# Patient Record
Sex: FEMALE | Race: WHITE | NOT HISPANIC OR LATINO | Employment: OTHER | URBAN - METROPOLITAN AREA
[De-identification: names, ages, dates, MRNs, and addresses within clinical notes are randomized per-mention and may not be internally consistent; named-entity substitution may affect disease eponyms.]

---

## 2017-12-07 ENCOUNTER — HOSPITAL ENCOUNTER (OUTPATIENT)
Dept: SLEEP MEDICINE | Facility: HOSPITAL | Age: 22
Discharge: HOME | End: 2017-12-07
Attending: INTERNAL MEDICINE
Payer: COMMERCIAL

## 2017-12-07 DIAGNOSIS — G47.10 PERSISTENT DISORDER OF INITIATING OR MAINTAINING WAKEFULNESS: ICD-10-CM

## 2017-12-07 PROCEDURE — 95806 SLEEP STUDY UNATT&RESP EFFT: CPT

## 2018-04-13 ENCOUNTER — HOSPITAL ENCOUNTER (OUTPATIENT)
Dept: SLEEP MEDICINE | Facility: HOSPITAL | Age: 23
Discharge: HOME | End: 2018-04-13
Attending: INTERNAL MEDICINE
Payer: COMMERCIAL

## 2018-04-13 DIAGNOSIS — G47.30 INSOMNIA WITH SLEEP APNEA: ICD-10-CM

## 2018-04-13 DIAGNOSIS — G47.00 INSOMNIA WITH SLEEP APNEA: ICD-10-CM

## 2018-04-13 PROCEDURE — 95810 POLYSOM 6/> YRS 4/> PARAM: CPT | Mod: 26,,, | Performed by: INTERNAL MEDICINE

## 2018-04-13 PROCEDURE — 95810 POLYSOM 6/> YRS 4/> PARAM: CPT

## 2021-06-03 ENCOUNTER — OFFICE VISIT (OUTPATIENT)
Dept: URGENT CARE | Facility: CLINIC | Age: 26
End: 2021-06-03
Payer: COMMERCIAL

## 2021-06-03 VITALS
HEIGHT: 65 IN | OXYGEN SATURATION: 99 % | SYSTOLIC BLOOD PRESSURE: 123 MMHG | WEIGHT: 272 LBS | DIASTOLIC BLOOD PRESSURE: 84 MMHG | HEART RATE: 95 BPM | TEMPERATURE: 98 F | RESPIRATION RATE: 16 BRPM | BODY MASS INDEX: 45.32 KG/M2

## 2021-06-03 DIAGNOSIS — J02.9 SORE THROAT: Primary | ICD-10-CM

## 2021-06-03 LAB
CTP QC/QA: YES
SARS-COV-2 RDRP RESP QL NAA+PROBE: NEGATIVE

## 2021-06-03 PROCEDURE — 3008F PR BODY MASS INDEX (BMI) DOCUMENTED: ICD-10-PCS | Mod: CPTII,S$GLB,, | Performed by: NURSE PRACTITIONER

## 2021-06-03 PROCEDURE — U0002 COVID-19 LAB TEST NON-CDC: HCPCS | Mod: QW,S$GLB,, | Performed by: NURSE PRACTITIONER

## 2021-06-03 PROCEDURE — 99203 PR OFFICE/OUTPT VISIT, NEW, LEVL III, 30-44 MIN: ICD-10-PCS | Mod: S$GLB,,, | Performed by: NURSE PRACTITIONER

## 2021-06-03 PROCEDURE — 3008F BODY MASS INDEX DOCD: CPT | Mod: CPTII,S$GLB,, | Performed by: NURSE PRACTITIONER

## 2021-06-03 PROCEDURE — 99203 OFFICE O/P NEW LOW 30 MIN: CPT | Mod: S$GLB,,, | Performed by: NURSE PRACTITIONER

## 2021-06-03 PROCEDURE — U0002: ICD-10-PCS | Mod: QW,S$GLB,, | Performed by: NURSE PRACTITIONER

## 2021-06-03 RX ORDER — BROMPHENIRAMINE MALEATE, PSEUDOEPHEDRINE HYDROCHLORIDE, AND DEXTROMETHORPHAN HYDROBROMIDE 2; 30; 10 MG/5ML; MG/5ML; MG/5ML
10 SYRUP ORAL EVERY 6 HOURS PRN
Qty: 118 ML | Refills: 0 | Status: SHIPPED | OUTPATIENT
Start: 2021-06-03 | End: 2021-06-13

## 2021-06-03 RX ORDER — PREDNISONE 10 MG/1
10 TABLET ORAL DAILY
Qty: 5 TABLET | Refills: 0 | Status: SHIPPED | OUTPATIENT
Start: 2021-06-03 | End: 2021-06-08

## 2021-06-24 PROBLEM — R15.9 INCONTINENCE OF FECES: Status: ACTIVE | Noted: 2021-06-24

## 2021-09-29 ENCOUNTER — OFFICE VISIT (OUTPATIENT)
Dept: NEUROLOGY | Facility: CLINIC | Age: 26
End: 2021-09-29
Payer: COMMERCIAL

## 2021-09-29 VITALS
HEIGHT: 65 IN | WEIGHT: 272.69 LBS | SYSTOLIC BLOOD PRESSURE: 110 MMHG | HEART RATE: 88 BPM | BODY MASS INDEX: 45.43 KG/M2 | DIASTOLIC BLOOD PRESSURE: 86 MMHG | RESPIRATION RATE: 16 BRPM

## 2021-09-29 DIAGNOSIS — M54.14 RADICULAR PAIN OF THORACIC REGION: Primary | ICD-10-CM

## 2021-09-29 DIAGNOSIS — Z91.419 HISTORY OF ABUSE IN ADULTHOOD: ICD-10-CM

## 2021-09-29 DIAGNOSIS — F44.9 CONVERSION DISORDER: ICD-10-CM

## 2021-09-29 DIAGNOSIS — Z62.819 HISTORY OF ABUSE IN CHILDHOOD: ICD-10-CM

## 2021-09-29 PROCEDURE — 3079F PR MOST RECENT DIASTOLIC BLOOD PRESSURE 80-89 MM HG: ICD-10-PCS | Mod: CPTII,S$GLB,, | Performed by: PSYCHIATRY & NEUROLOGY

## 2021-09-29 PROCEDURE — 1159F MED LIST DOCD IN RCRD: CPT | Mod: CPTII,S$GLB,, | Performed by: PSYCHIATRY & NEUROLOGY

## 2021-09-29 PROCEDURE — 3008F BODY MASS INDEX DOCD: CPT | Mod: CPTII,S$GLB,, | Performed by: PSYCHIATRY & NEUROLOGY

## 2021-09-29 PROCEDURE — 99203 OFFICE O/P NEW LOW 30 MIN: CPT | Mod: S$GLB,,, | Performed by: PSYCHIATRY & NEUROLOGY

## 2021-09-29 PROCEDURE — 1160F PR REVIEW ALL MEDS BY PRESCRIBER/CLIN PHARMACIST DOCUMENTED: ICD-10-PCS | Mod: CPTII,S$GLB,, | Performed by: PSYCHIATRY & NEUROLOGY

## 2021-09-29 PROCEDURE — 4010F ACE/ARB THERAPY RXD/TAKEN: CPT | Mod: CPTII,S$GLB,, | Performed by: PSYCHIATRY & NEUROLOGY

## 2021-09-29 PROCEDURE — 3079F DIAST BP 80-89 MM HG: CPT | Mod: CPTII,S$GLB,, | Performed by: PSYCHIATRY & NEUROLOGY

## 2021-09-29 PROCEDURE — 1160F RVW MEDS BY RX/DR IN RCRD: CPT | Mod: CPTII,S$GLB,, | Performed by: PSYCHIATRY & NEUROLOGY

## 2021-09-29 PROCEDURE — 3074F SYST BP LT 130 MM HG: CPT | Mod: CPTII,S$GLB,, | Performed by: PSYCHIATRY & NEUROLOGY

## 2021-09-29 PROCEDURE — 3074F PR MOST RECENT SYSTOLIC BLOOD PRESSURE < 130 MM HG: ICD-10-PCS | Mod: CPTII,S$GLB,, | Performed by: PSYCHIATRY & NEUROLOGY

## 2021-09-29 PROCEDURE — 99999 PR PBB SHADOW E&M-EST. PATIENT-LVL IV: CPT | Mod: PBBFAC,,, | Performed by: PSYCHIATRY & NEUROLOGY

## 2021-09-29 PROCEDURE — 99203 PR OFFICE/OUTPT VISIT, NEW, LEVL III, 30-44 MIN: ICD-10-PCS | Mod: S$GLB,,, | Performed by: PSYCHIATRY & NEUROLOGY

## 2021-09-29 PROCEDURE — 99999 PR PBB SHADOW E&M-EST. PATIENT-LVL IV: ICD-10-PCS | Mod: PBBFAC,,, | Performed by: PSYCHIATRY & NEUROLOGY

## 2021-09-29 PROCEDURE — 4010F PR ACE/ARB THEARPY RXD/TAKEN: ICD-10-PCS | Mod: CPTII,S$GLB,, | Performed by: PSYCHIATRY & NEUROLOGY

## 2021-09-29 PROCEDURE — 3008F PR BODY MASS INDEX (BMI) DOCUMENTED: ICD-10-PCS | Mod: CPTII,S$GLB,, | Performed by: PSYCHIATRY & NEUROLOGY

## 2021-09-29 PROCEDURE — 1159F PR MEDICATION LIST DOCUMENTED IN MEDICAL RECORD: ICD-10-PCS | Mod: CPTII,S$GLB,, | Performed by: PSYCHIATRY & NEUROLOGY

## 2021-11-07 ENCOUNTER — OFFICE VISIT (OUTPATIENT)
Dept: URGENT CARE | Facility: CLINIC | Age: 26
End: 2021-11-07
Payer: COMMERCIAL

## 2021-11-07 VITALS
RESPIRATION RATE: 19 BRPM | WEIGHT: 273 LBS | HEIGHT: 65 IN | TEMPERATURE: 99 F | SYSTOLIC BLOOD PRESSURE: 93 MMHG | BODY MASS INDEX: 45.48 KG/M2 | HEART RATE: 92 BPM | DIASTOLIC BLOOD PRESSURE: 66 MMHG | OXYGEN SATURATION: 97 %

## 2021-11-07 DIAGNOSIS — B96.89 ACUTE BACTERIAL RHINOSINUSITIS: Primary | ICD-10-CM

## 2021-11-07 DIAGNOSIS — J01.90 ACUTE BACTERIAL RHINOSINUSITIS: Primary | ICD-10-CM

## 2021-11-07 PROCEDURE — 1159F PR MEDICATION LIST DOCUMENTED IN MEDICAL RECORD: ICD-10-PCS | Mod: CPTII,S$GLB,, | Performed by: NURSE PRACTITIONER

## 2021-11-07 PROCEDURE — 3078F DIAST BP <80 MM HG: CPT | Mod: CPTII,S$GLB,, | Performed by: NURSE PRACTITIONER

## 2021-11-07 PROCEDURE — 4010F PR ACE/ARB THEARPY RXD/TAKEN: ICD-10-PCS | Mod: CPTII,S$GLB,, | Performed by: NURSE PRACTITIONER

## 2021-11-07 PROCEDURE — 99213 OFFICE O/P EST LOW 20 MIN: CPT | Mod: S$GLB,,, | Performed by: NURSE PRACTITIONER

## 2021-11-07 PROCEDURE — 3008F BODY MASS INDEX DOCD: CPT | Mod: CPTII,S$GLB,, | Performed by: NURSE PRACTITIONER

## 2021-11-07 PROCEDURE — 1160F PR REVIEW ALL MEDS BY PRESCRIBER/CLIN PHARMACIST DOCUMENTED: ICD-10-PCS | Mod: CPTII,S$GLB,, | Performed by: NURSE PRACTITIONER

## 2021-11-07 PROCEDURE — 3008F PR BODY MASS INDEX (BMI) DOCUMENTED: ICD-10-PCS | Mod: CPTII,S$GLB,, | Performed by: NURSE PRACTITIONER

## 2021-11-07 PROCEDURE — 4010F ACE/ARB THERAPY RXD/TAKEN: CPT | Mod: CPTII,S$GLB,, | Performed by: NURSE PRACTITIONER

## 2021-11-07 PROCEDURE — 1159F MED LIST DOCD IN RCRD: CPT | Mod: CPTII,S$GLB,, | Performed by: NURSE PRACTITIONER

## 2021-11-07 PROCEDURE — 3078F PR MOST RECENT DIASTOLIC BLOOD PRESSURE < 80 MM HG: ICD-10-PCS | Mod: CPTII,S$GLB,, | Performed by: NURSE PRACTITIONER

## 2021-11-07 PROCEDURE — 1160F RVW MEDS BY RX/DR IN RCRD: CPT | Mod: CPTII,S$GLB,, | Performed by: NURSE PRACTITIONER

## 2021-11-07 PROCEDURE — 3074F PR MOST RECENT SYSTOLIC BLOOD PRESSURE < 130 MM HG: ICD-10-PCS | Mod: CPTII,S$GLB,, | Performed by: NURSE PRACTITIONER

## 2021-11-07 PROCEDURE — 99213 PR OFFICE/OUTPT VISIT, EST, LEVL III, 20-29 MIN: ICD-10-PCS | Mod: S$GLB,,, | Performed by: NURSE PRACTITIONER

## 2021-11-07 PROCEDURE — 3074F SYST BP LT 130 MM HG: CPT | Mod: CPTII,S$GLB,, | Performed by: NURSE PRACTITIONER

## 2021-11-07 RX ORDER — AMOXICILLIN 875 MG/1
875 TABLET, FILM COATED ORAL EVERY 12 HOURS
Qty: 20 TABLET | Refills: 0 | Status: SHIPPED | OUTPATIENT
Start: 2021-11-07 | End: 2021-11-17

## 2022-02-14 ENCOUNTER — PATIENT MESSAGE (OUTPATIENT)
Dept: PAIN MEDICINE | Facility: CLINIC | Age: 27
End: 2022-02-14
Payer: COMMERCIAL

## 2022-03-29 ENCOUNTER — HOSPITAL ENCOUNTER (OUTPATIENT)
Dept: RADIOLOGY | Facility: HOSPITAL | Age: 27
Discharge: HOME | End: 2022-03-29
Attending: INTERNAL MEDICINE
Payer: COMMERCIAL

## 2022-03-29 DIAGNOSIS — R91.8 ABNORMAL CHEST X-RAY WITH MULTIPLE NODULES: Primary | ICD-10-CM

## 2022-03-29 DIAGNOSIS — R91.8 ABNORMAL CHEST X-RAY WITH MULTIPLE NODULES: ICD-10-CM

## 2022-03-29 PROCEDURE — 71046 X-RAY EXAM CHEST 2 VIEWS: CPT | Mod: TC

## 2022-07-08 ENCOUNTER — TELEPHONE (OUTPATIENT)
Dept: NEUROLOGY | Facility: CLINIC | Age: 27
End: 2022-07-08
Payer: COMMERCIAL

## 2022-07-08 NOTE — TELEPHONE ENCOUNTER
----- Message from Garrett Mccallum sent at 7/8/2022  3:19 PM CDT -----  Please review the referral/records, scanned into , Clinic  received from Abdulkadir Benitez (you will also find records in the chart, as the provider has access to Epic). The patient has scheduled themselves with Dr. Rogers. I read the most recent clinical notes and MRI but did not see suspicions or finding of lesions/MS.    Please contact the patient if they can see Dr. Stock or Dr. Rogers. If not, please let me know and I will contact the referring office.    Thank you,    Garrett Mccallum  Clinic

## 2022-07-26 ENCOUNTER — OFFICE VISIT (OUTPATIENT)
Dept: PAIN MEDICINE | Facility: CLINIC | Age: 27
End: 2022-07-26
Payer: COMMERCIAL

## 2022-07-26 ENCOUNTER — OFFICE VISIT (OUTPATIENT)
Dept: RHEUMATOLOGY | Facility: CLINIC | Age: 27
End: 2022-07-26
Payer: COMMERCIAL

## 2022-07-26 VITALS
OXYGEN SATURATION: 98 % | BODY MASS INDEX: 45.26 KG/M2 | HEIGHT: 65 IN | RESPIRATION RATE: 20 BRPM | DIASTOLIC BLOOD PRESSURE: 75 MMHG | HEART RATE: 89 BPM | SYSTOLIC BLOOD PRESSURE: 117 MMHG | WEIGHT: 271.63 LBS

## 2022-07-26 VITALS
WEIGHT: 268.94 LBS | BODY MASS INDEX: 44.81 KG/M2 | SYSTOLIC BLOOD PRESSURE: 125 MMHG | HEART RATE: 91 BPM | HEIGHT: 65 IN | DIASTOLIC BLOOD PRESSURE: 84 MMHG

## 2022-07-26 DIAGNOSIS — F45.0 SOMATIZATION DISORDER: ICD-10-CM

## 2022-07-26 DIAGNOSIS — G90.59 COMPLEX REGIONAL PAIN SYNDROME TYPE 1 AFFECTING MULTIPLE SITES: Primary | ICD-10-CM

## 2022-07-26 DIAGNOSIS — M54.14 RADICULAR PAIN OF THORACIC REGION: ICD-10-CM

## 2022-07-26 DIAGNOSIS — Z71.89 COUNSELING AND COORDINATION OF CARE: ICD-10-CM

## 2022-07-26 DIAGNOSIS — G89.4 CHRONIC PAIN SYNDROME: ICD-10-CM

## 2022-07-26 DIAGNOSIS — E66.01 MORBID OBESITY: ICD-10-CM

## 2022-07-26 PROCEDURE — 3078F DIAST BP <80 MM HG: CPT | Mod: CPTII,S$GLB,, | Performed by: INTERNAL MEDICINE

## 2022-07-26 PROCEDURE — 1160F PR REVIEW ALL MEDS BY PRESCRIBER/CLIN PHARMACIST DOCUMENTED: ICD-10-PCS | Mod: CPTII,S$GLB,, | Performed by: PHYSICAL MEDICINE & REHABILITATION

## 2022-07-26 PROCEDURE — 3008F PR BODY MASS INDEX (BMI) DOCUMENTED: ICD-10-PCS | Mod: CPTII,S$GLB,, | Performed by: INTERNAL MEDICINE

## 2022-07-26 PROCEDURE — 99204 PR OFFICE/OUTPT VISIT, NEW, LEVL IV, 45-59 MIN: ICD-10-PCS | Mod: S$GLB,,, | Performed by: INTERNAL MEDICINE

## 2022-07-26 PROCEDURE — 4010F ACE/ARB THERAPY RXD/TAKEN: CPT | Mod: CPTII,S$GLB,, | Performed by: INTERNAL MEDICINE

## 2022-07-26 PROCEDURE — 99204 OFFICE O/P NEW MOD 45 MIN: CPT | Mod: S$GLB,,, | Performed by: PHYSICAL MEDICINE & REHABILITATION

## 2022-07-26 PROCEDURE — 1159F PR MEDICATION LIST DOCUMENTED IN MEDICAL RECORD: ICD-10-PCS | Mod: CPTII,S$GLB,, | Performed by: PHYSICAL MEDICINE & REHABILITATION

## 2022-07-26 PROCEDURE — 3074F SYST BP LT 130 MM HG: CPT | Mod: CPTII,S$GLB,, | Performed by: PHYSICAL MEDICINE & REHABILITATION

## 2022-07-26 PROCEDURE — 3008F BODY MASS INDEX DOCD: CPT | Mod: CPTII,S$GLB,, | Performed by: INTERNAL MEDICINE

## 2022-07-26 PROCEDURE — 3008F PR BODY MASS INDEX (BMI) DOCUMENTED: ICD-10-PCS | Mod: CPTII,S$GLB,, | Performed by: PHYSICAL MEDICINE & REHABILITATION

## 2022-07-26 PROCEDURE — 1159F MED LIST DOCD IN RCRD: CPT | Mod: CPTII,S$GLB,, | Performed by: INTERNAL MEDICINE

## 2022-07-26 PROCEDURE — 3079F PR MOST RECENT DIASTOLIC BLOOD PRESSURE 80-89 MM HG: ICD-10-PCS | Mod: CPTII,S$GLB,, | Performed by: PHYSICAL MEDICINE & REHABILITATION

## 2022-07-26 PROCEDURE — 3074F PR MOST RECENT SYSTOLIC BLOOD PRESSURE < 130 MM HG: ICD-10-PCS | Mod: CPTII,S$GLB,, | Performed by: PHYSICAL MEDICINE & REHABILITATION

## 2022-07-26 PROCEDURE — 99999 PR PBB SHADOW E&M-EST. PATIENT-LVL V: ICD-10-PCS | Mod: PBBFAC,,, | Performed by: INTERNAL MEDICINE

## 2022-07-26 PROCEDURE — 4010F PR ACE/ARB THEARPY RXD/TAKEN: ICD-10-PCS | Mod: CPTII,S$GLB,, | Performed by: PHYSICAL MEDICINE & REHABILITATION

## 2022-07-26 PROCEDURE — 99204 PR OFFICE/OUTPT VISIT, NEW, LEVL IV, 45-59 MIN: ICD-10-PCS | Mod: S$GLB,,, | Performed by: PHYSICAL MEDICINE & REHABILITATION

## 2022-07-26 PROCEDURE — 3008F BODY MASS INDEX DOCD: CPT | Mod: CPTII,S$GLB,, | Performed by: PHYSICAL MEDICINE & REHABILITATION

## 2022-07-26 PROCEDURE — 4010F ACE/ARB THERAPY RXD/TAKEN: CPT | Mod: CPTII,S$GLB,, | Performed by: PHYSICAL MEDICINE & REHABILITATION

## 2022-07-26 PROCEDURE — 3078F PR MOST RECENT DIASTOLIC BLOOD PRESSURE < 80 MM HG: ICD-10-PCS | Mod: CPTII,S$GLB,, | Performed by: INTERNAL MEDICINE

## 2022-07-26 PROCEDURE — 1159F PR MEDICATION LIST DOCUMENTED IN MEDICAL RECORD: ICD-10-PCS | Mod: CPTII,S$GLB,, | Performed by: INTERNAL MEDICINE

## 2022-07-26 PROCEDURE — 99999 PR PBB SHADOW E&M-EST. PATIENT-LVL V: ICD-10-PCS | Mod: PBBFAC,,, | Performed by: PHYSICAL MEDICINE & REHABILITATION

## 2022-07-26 PROCEDURE — 1159F MED LIST DOCD IN RCRD: CPT | Mod: CPTII,S$GLB,, | Performed by: PHYSICAL MEDICINE & REHABILITATION

## 2022-07-26 PROCEDURE — 99999 PR PBB SHADOW E&M-EST. PATIENT-LVL V: CPT | Mod: PBBFAC,,, | Performed by: INTERNAL MEDICINE

## 2022-07-26 PROCEDURE — 3074F PR MOST RECENT SYSTOLIC BLOOD PRESSURE < 130 MM HG: ICD-10-PCS | Mod: CPTII,S$GLB,, | Performed by: INTERNAL MEDICINE

## 2022-07-26 PROCEDURE — 99204 OFFICE O/P NEW MOD 45 MIN: CPT | Mod: S$GLB,,, | Performed by: INTERNAL MEDICINE

## 2022-07-26 PROCEDURE — 1160F RVW MEDS BY RX/DR IN RCRD: CPT | Mod: CPTII,S$GLB,, | Performed by: PHYSICAL MEDICINE & REHABILITATION

## 2022-07-26 PROCEDURE — 99999 PR PBB SHADOW E&M-EST. PATIENT-LVL V: CPT | Mod: PBBFAC,,, | Performed by: PHYSICAL MEDICINE & REHABILITATION

## 2022-07-26 PROCEDURE — 3074F SYST BP LT 130 MM HG: CPT | Mod: CPTII,S$GLB,, | Performed by: INTERNAL MEDICINE

## 2022-07-26 PROCEDURE — 3079F DIAST BP 80-89 MM HG: CPT | Mod: CPTII,S$GLB,, | Performed by: PHYSICAL MEDICINE & REHABILITATION

## 2022-07-26 PROCEDURE — 4010F PR ACE/ARB THEARPY RXD/TAKEN: ICD-10-PCS | Mod: CPTII,S$GLB,, | Performed by: INTERNAL MEDICINE

## 2022-07-26 NOTE — PROGRESS NOTES
RHEUMATOLOGY OUTPATIENT CLINIC NOTE    7/26/2022    Attending Rheumatologist: Vidal Batista  Primary Care Provider: Debi Campos MD   Physician Requesting Consultation: Aaareferral Self  No address on file  Chief Complaint/Reason For Consultation:  No chief complaint on file.      Subjective:       HPI  Charlene Mohan is a 26 y.o. White female with medical history noted below who presents for evaluation of AI disease.     Patient presents for evaluation of autoimmune disease. Patient notes in February of 2021 left flank pain that radiates into her left hip, then June 2021, had epidural for the pain which was complicated by numbness of her left leg and los of bladder and bowel control. She note regaining her bowel and bladder after two days. Then she still notes numbness and weakness on the left side of the body, has followed with Neurology and told possibe Conversion disorder. Also notes nail peeling and falling off. +Alopecia, fatigue, poor sleep, wt loss (20 lbs in 2 years), paraesthesias of the hands, snores, GERD, SOB. No rash, no oral/nasal sores, fever, pleurisy/serositis, miscarriages/blood clots, Raynaud's, chest pain. FHX-Mom Graves,OA; Dad-HLD, PTSD, HTN.      Review of Systems   Constitutional: Positive for fatigue. Negative for chills, fever and unexpected weight change.   HENT: Negative for mouth sores and trouble swallowing.    Eyes: Negative for redness and eye dryness.   Respiratory: Positive for cough and shortness of breath.    Cardiovascular: Negative for chest pain.   Gastrointestinal: Negative for abdominal distention, constipation, diarrhea, nausea and vomiting.   Genitourinary: Negative for dysuria, genital sores and vaginal dryness.   Musculoskeletal: Positive for back pain. Negative for arthralgias, gait problem, joint swelling, leg pain, myalgias, neck pain, neck stiffness and joint deformity.   Integumentary:  Negative for rash.   Neurological: Positive for numbness and  headaches. Negative for weakness.   Hematological: Negative for adenopathy. Bruises/bleeds easily.   Psychiatric/Behavioral: Positive for sleep disturbance. Negative for confusion and decreased concentration. The patient is not nervous/anxious.    All other systems reviewed and are negative.       Chronic comorbid conditions affecting medical decision making today:  Past Medical History:   Diagnosis Date    Dyslipidemia     Dysmetabolic syndrome X     Essential hypertension, benign     History of polycystic ovarian syndrome     Hypothyroidism     Obesity      Past Surgical History:   Procedure Laterality Date    ADENOIDECTOMY      SPINAL CORD STIMULATOR IMPLANT      TONSILLECTOMY      WISDOM TOOTH EXTRACTION       Family History   Problem Relation Age of Onset    Hypertension Father     Hyperlipidemia Father     Thyroid disease Mother     Diabetes Maternal Grandmother     Hypertension Paternal Grandmother     Diabetes Paternal Grandmother     Hypertension Paternal Grandfather     Diabetes Paternal Grandfather      Social History     Substance and Sexual Activity   Alcohol Use No     Social History     Tobacco Use   Smoking Status Never Smoker   Smokeless Tobacco Never Used     Social History     Substance and Sexual Activity   Drug Use No       Current Outpatient Medications:     albuterol (PROVENTIL/VENTOLIN HFA) 90 mcg/actuation inhaler, INHALE 2 PUFFS BY MOUTH EVERY 4 TO 6 HOURS AS NEEDED FOR SHORTNESS OF BREATH OR WHEEZING, Disp: , Rfl:     atorvastatin (LIPITOR) 10 MG tablet, Take 10 mg by mouth once daily., Disp: , Rfl:     benzonatate (TESSALON) 100 MG capsule, Take 100 mg by mouth 3 (three) times daily as needed., Disp: , Rfl:     BREO ELLIPTA 100-25 mcg/dose diskus inhaler, Inhale 1 puff into the lungs once daily., Disp: , Rfl:     buPROPion (WELLBUTRIN XL) 300 MG 24 hr tablet, Take 1 tablet by mouth once daily., Disp: , Rfl:     busPIRone (BUSPAR) 5 MG Tab, Take 7.5 mg by mouth 3  (three) times daily., Disp: , Rfl:     CELEBREX 200 mg capsule, Take 200 mg by mouth 2 (two) times daily., Disp: , Rfl:     clonazePAM (KLONOPIN) 0.5 MG tablet, Take 0.25 mg by mouth 2 (two) times daily as needed. , Disp: , Rfl:     eszopiclone (LUNESTA) 1 MG Tab, Take 1 mg by mouth once daily., Disp: , Rfl:     fremanezumab-vfrm (AJOVY SYRINGE) 225 mg/1.5 mL injection, Inject 1.5 mLs (225 mg total) into the skin every 28 days., Disp: 1 mL, Rfl: 11    gabapentin (NEURONTIN) 300 MG capsule, Take 600 mg by mouth 3 (three) times daily., Disp: , Rfl:     HYDROcodone-acetaminophen (NORCO) 7.5-325 mg per tablet, Take 1 tablet by mouth every 8 (eight) hours as needed for Pain., Disp: 15 tablet, Rfl: 0    lisinopriL 10 MG tablet, Take 10 mg by mouth once daily., Disp: , Rfl:     methocarbamoL (ROBAXIN) 750 MG Tab, Take 1 tablet (750 mg total) by mouth 2 (two) times daily as needed (muscle spasms)., Disp: 60 tablet, Rfl: 5    metoprolol succinate (TOPROL-XL) 50 MG 24 hr tablet, Take 50 mg by mouth once daily., Disp: , Rfl:     montelukast (SINGULAIR) 10 mg tablet, Take 10 mg by mouth once daily., Disp: , Rfl:     omalizumab (XOLAIR) 150 mg/mL injection, , Disp: , Rfl:     ondansetron (ZOFRAN) 8 MG tablet, Take 8 mg by mouth 2 (two) times daily as needed., Disp: , Rfl:     OXcarbazepine (TRILEPTAL) 300 MG Tab, Take 1 tablet (300 mg total) by mouth 2 (two) times daily., Disp: 60 tablet, Rfl: 11    oxyCODONE-acetaminophen (PERCOCET) 5-325 mg per tablet, TAKE 1 TABLET BY MOUTH 1 TIME AS NEEDED FOR PAIN, Disp: , Rfl:     pantoprazole (PROTONIX) 40 MG tablet, Take 1 tablet (40 mg total) by mouth once daily. (Patient taking differently: Take 40 mg by mouth 2 (two) times daily.), Disp: 30 tablet, Rfl: 3    pregabalin (LYRICA) 150 MG capsule, Take 150 mg by mouth 3 (three) times daily., Disp: , Rfl:     rizatriptan (MAXALT) 10 MG tablet, Take 1 tablet by mouth. At onset of migraine, can take another tab 2 hrs later  if no relief, Disp: , Rfl:     spironolactone (ALDACTONE) 100 MG tablet, Take 100 mg by mouth 2 (two) times daily., Disp: , Rfl:     traZODone (DESYREL) 150 MG tablet, Take 1 tablet by mouth once daily., Disp: , Rfl:     venlafaxine (EFFEXOR-XR) 150 MG Cp24, Take 2 capsules by mouth once daily., Disp: , Rfl:      Objective:         Vitals:    07/26/22 1411   BP: 117/75   Pulse: 89   Resp: 20     Physical Exam  Can make fist, no synovitis  Onycholysis   Left flank TTP with prior surgical scars   Negative ankle/MTP     Reviewed old and all outside pertinent medical records available.    All lab results personally reviewed and interpreted by me.  Lab Results   Component Value Date    WBC 16.70 (H) 06/23/2021    HGB 13.2 06/23/2021    HCT 38.6 06/23/2021    MCV 90 06/23/2021    MCH 30.7 06/23/2021    MCHC 34.2 06/23/2021    RDW 12.4 06/23/2021     06/23/2021    MPV 7.1 (L) 06/23/2021    PLTEST Appears normal 06/23/2021       Lab Results   Component Value Date     06/23/2021    K 4.5 06/23/2021     06/23/2021    CO2 24 06/23/2021    GLU 91 06/23/2021    BUN 15 06/23/2021    CALCIUM 8.9 06/23/2021    PROT 7.8 06/23/2021    ALBUMIN 4.7 06/23/2021    BILITOT 0.4 06/23/2021    AST 30 06/23/2021    ALKPHOS 65 06/23/2021    ALT 28 06/23/2021       Lab Results   Component Value Date    COLORU Colorless (A) 06/23/2021    APPEARANCEUA Clear 06/23/2021    SPECGRAV 1.015 06/23/2021    PHUR 6.0 06/23/2021    PROTEINUA Negative 06/23/2021    KETONESU Negative 06/23/2021    LEUKOCYTESUR Negative 06/23/2021    NITRITE Negative 06/23/2021    UROBILINOGEN Negative 06/23/2021       Lab Results   Component Value Date    CRP 5.1 06/23/2021       Lab Results   Component Value Date    SEDRATE 40 (H) 09/17/2011       Lab Results   Component Value Date    SEDRATE 40 (H) 09/17/2011       No components found for: 25OHVITDTOT, 21QPMOUE5, 96QZHSNW5, METHODNOTE    Lab Results   Component Value Date    URICACID 4.4 04/21/2011        No components found for: TSPOTTB        Imaging:  All imaging reviewed and independently interpreted by me.         ASSESSMENT / PLAN:     Charlene Mohan is a 26 y.o. White female with:      1. Complex regional pain syndrome type 1 affecting multiple sites  - patients flank pain with numbness and tingling with spinal stimulator and worsening after epidural may be CRPS, she has Hx of Shingles, Post Herpetic Neuralgia is a possibility, she has autoimmune diathesis and would like to get worked up  - work up as below  - continue meds per Neuro and Pain  - reassurance  - C-Reactive Protein; Future  - SERGIO Screen w/Reflex; Future  - Rheumatoid Factor; Future  - CBC Auto Differential; Future  - Comprehensive Metabolic Panel; Future  - Cyclic Citrullinated Peptide Antibody, IgG; Future  - Sedimentation rate; Future  - Vitamin D; Future  - TSH; Future  - THYROID PEROXIDASE ANTIBODY; Future  - CK; Future  - Vitamin B12; Future  - Hepatitis B Surface Ab, Qualitative; Future  - Hepatitis C Antibody; Future  - Hepatitis B Surface Antigen; Future  - Quantiferon Gold TB; Future  - HIV 1/2 Ag/Ab (4th Gen); Future  - RPR; Future  - Anti-Histone Antibody; Future  - Immunoglobulins (IgG, IgA, IgM) Quantitative; Future    2. Other specified counseling  - over 10 minutes spent regarding below topics:  - Immunization counseling done.  - Weight loss counseling done.  - Nutrition and exercise counseling.  - Limitation of alcohol consumption.  - Regular exercise:  Aerobic and resistance.  - Medication counseling provided.    3. Morbid Obesity  - would benefit from decreasing at least 10% of body weight.  - recommended goal of losing 1 lb per week.  - consider nutritionist evaluation.    No follow-ups on file.    Method of contact with patient concerns: [facility]    Disclaimer:  This note is prepared using voice recognition software and as such is likely to have errors and has not been proof read. Please contact me for  questions.     Time spent: 45 minutes in face to face discussion concerning diagnosis, prognosis, review of lab and test results, benefits of treatment as well as management of disease, counseling of patient and coordination of care between various health care providers.  Greater than half the time spent was used for coordination of care and counseling of patient.    Vidal Batista M.D.  Rheumatology Department   [facility]

## 2022-07-26 NOTE — PROGRESS NOTES
[facility]  New Patient H&P    Referring Provider: Abdulkadir Benitez Md  [address]   [address]  [place] [address]    Chief Complaint:   Chief Complaint   Patient presents with    Back Pain     Left side, started feb 2021       History of Present Illness: Charlene Mohan is a 26 y.o. female referred by Dr. Abdulkadir Benitez for thoracic radiculopathy.     Her pain has been present since 02/2021. She denies traumatic onset, just woke one morning in significant pain. Pain is localized to the left lateral and posterior chest wall around lower ribs with rare radiation to her hip. Pain is described as sharp and shooting. The pain is aggravated by movement, palpation. The pain is alleviated by medications. She endorses weakness and complete numbness throughout her left side. She had sudden onset of weakness and numbness in 06/2021 with bowel and bladder incontinence. Her work-up was largely negative at that time at [facility], and she was diagnosed with conversion disorder. Since then her incontinence has improved. Denies recent fevers or infections. Denies unexplained weight loss.     She has been previously treated by Dr. Kelsey for this issue. She has had multiple epidural injections (no improvement), SCS (70% improvement with trial, worsening pain with placement), and RFA (improvement of lower back pain but not thoracic pain). She has not completed therapy for her back. She is currently in therapy for a Right knee ACL tear. She has also completed therapy when she had the onset of weakness and numbness.      Severity: Currently: 7/10   Typical Range: 5-10/10     Exacerbation: 10/10     Pain Disability Index  Family/Home Responsibilities:: 8  Recreation:: 10  Social Activity:: 6  Occupation:: 9  Sexual Behavior:: 10  Self Care:: 8  Life-Support Activities:: 3  Pain Disability Index (PDI): 54    Previous Interventions:  - multiple epidural injections (no improvement)  - SCS (70% improvement with trial, worse  with placement)  - RFA (improvement of lower back pain but not thoracic pain)    Previous Therapies:  PT/OT: Completed therapy for her knees and limb weakness/numbness  Relevant Surgery: no   Previous Medications:   - NSAIDS:   - Muscle Relaxants: Robaxin   - TCAs:   - SNRIs:   - Topicals: Lidocaine, Capsaicin  - Anticonvulsants: Lyrica, Gabapentin, Trileptal   - Opioids: Percocet    Current Pain Medications:  1. Lyrica 150 mg TID  2. Gabapentin 600 mg TID  3. Trileptal 300 mg BID  4. Robaxin 750 mg QID    Blood Thinners: none    Full Medication List:    Current Outpatient Medications:     albuterol (PROVENTIL/VENTOLIN HFA) 90 mcg/actuation inhaler, INHALE 2 PUFFS BY MOUTH EVERY 4 TO 6 HOURS AS NEEDED FOR SHORTNESS OF BREATH OR WHEEZING, Disp: , Rfl:     atorvastatin (LIPITOR) 10 MG tablet, Take 10 mg by mouth once daily., Disp: , Rfl:     benzonatate (TESSALON) 100 MG capsule, Take 100 mg by mouth 3 (three) times daily as needed., Disp: , Rfl:     BREO ELLIPTA 100-25 mcg/dose diskus inhaler, Inhale 1 puff into the lungs once daily., Disp: , Rfl:     buPROPion (WELLBUTRIN XL) 300 MG 24 hr tablet, Take 1 tablet by mouth once daily., Disp: , Rfl:     busPIRone (BUSPAR) 5 MG Tab, Take 7.5 mg by mouth 3 (three) times daily., Disp: , Rfl:     CELEBREX 200 mg capsule, Take 200 mg by mouth 2 (two) times daily., Disp: , Rfl:     clonazePAM (KLONOPIN) 0.5 MG tablet, Take 0.25 mg by mouth 2 (two) times daily as needed. , Disp: , Rfl:     eszopiclone (LUNESTA) 1 MG Tab, Take 1 mg by mouth once daily., Disp: , Rfl:     fremanezumab-vfrm (AJOVY SYRINGE) 225 mg/1.5 mL injection, Inject 1.5 mLs (225 mg total) into the skin every 28 days., Disp: 1 mL, Rfl: 11    gabapentin (NEURONTIN) 300 MG capsule, Take 600 mg by mouth 3 (three) times daily., Disp: , Rfl:     HYDROcodone-acetaminophen (NORCO) 7.5-325 mg per tablet, Take 1 tablet by mouth every 8 (eight) hours as needed for Pain., Disp: 15 tablet, Rfl: 0    lisinopriL  10 MG tablet, Take 10 mg by mouth once daily., Disp: , Rfl:     methocarbamoL (ROBAXIN) 750 MG Tab, Take 1 tablet (750 mg total) by mouth 2 (two) times daily as needed (muscle spasms)., Disp: 60 tablet, Rfl: 5    metoprolol succinate (TOPROL-XL) 50 MG 24 hr tablet, Take 50 mg by mouth once daily., Disp: , Rfl:     montelukast (SINGULAIR) 10 mg tablet, Take 10 mg by mouth once daily., Disp: , Rfl:     omalizumab (XOLAIR) 150 mg/mL injection, , Disp: , Rfl:     ondansetron (ZOFRAN) 8 MG tablet, Take 8 mg by mouth 2 (two) times daily as needed., Disp: , Rfl:     oxyCODONE-acetaminophen (PERCOCET) 5-325 mg per tablet, TAKE 1 TABLET BY MOUTH 1 TIME AS NEEDED FOR PAIN, Disp: , Rfl:     pantoprazole (PROTONIX) 40 MG tablet, Take 1 tablet (40 mg total) by mouth once daily. (Patient taking differently: Take 40 mg by mouth 2 (two) times daily.), Disp: 30 tablet, Rfl: 3    pregabalin (LYRICA) 150 MG capsule, Take 150 mg by mouth 3 (three) times daily., Disp: , Rfl:     rizatriptan (MAXALT) 10 MG tablet, Take 1 tablet by mouth. At onset of migraine, can take another tab 2 hrs later if no relief, Disp: , Rfl:     spironolactone (ALDACTONE) 100 MG tablet, Take 100 mg by mouth 2 (two) times daily., Disp: , Rfl:     traZODone (DESYREL) 150 MG tablet, Take 1 tablet by mouth once daily., Disp: , Rfl:     venlafaxine (EFFEXOR-XR) 150 MG Cp24, Take 2 capsules by mouth once daily., Disp: , Rfl:     OXcarbazepine (TRILEPTAL) 300 MG Tab, Take 1 tablet (300 mg total) by mouth 2 (two) times daily., Disp: 60 tablet, Rfl: 11     Review of Systems:  Review of Systems   Constitutional: Negative for chills, fever and weight loss.   Respiratory: Negative for cough and shortness of breath.    Cardiovascular: Negative for chest pain, palpitations and leg swelling.   Gastrointestinal: Negative for abdominal pain, nausea and vomiting.   Genitourinary: Negative for dysuria and frequency.   Musculoskeletal: Positive for back pain and joint  "pain. Negative for neck pain.   Neurological: Positive for tingling, sensory change and weakness. Negative for dizziness.   Psychiatric/Behavioral: Negative for depression. The patient is not nervous/anxious.        Allergies:  Diclofenac and Levalbuterol hcl     Medical History:   has a past medical history of Dyslipidemia, Dysmetabolic syndrome X, Essential hypertension, benign, History of polycystic ovarian syndrome, Hypothyroidism, and Obesity.    Surgical History:   has a past surgical history that includes Tonsillectomy; Adenoidectomy; [place] tooth extraction; and Spinal cord stimulator implant.    Family History:  family history includes Diabetes in her maternal grandmother, paternal grandfather, and paternal grandmother; Hyperlipidemia in her father; Hypertension in her father, paternal grandfather, and paternal grandmother; Thyroid disease in her mother.    Social History:   reports that she has never smoked. She has never used smokeless tobacco. She reports that she does not drink alcohol and does not use drugs.    Physical Exam:  /84   Pulse 91   Ht 5' 5" (1.651 m)   Wt 122 kg (268 lb 15.4 oz)   BMI 44.76 kg/m²   GEN: No acute distress. Calm, comfortable  HENT: Normocephalic, atraumatic, moist mucous membranes  EYE: Anicteric sclera, non-injected.   CV: Non-diaphoretic. Regular Rate. Radial Pulses 2+.  RESP: Breathing comfortably. Chest expansion symmetric.  EXT: No clubbing, cyanosis.   SKIN: Warm, & dry to palpation. No visible rashes or lesions of exposed skin.   PSYCH: Pleasant mood and appropriate affect. Recent and remote memory intact.   GAIT: Independent, unsteady ambulation using rollator  Thoracic/Lumbar Spine Exam:       Inspection: No erythema, bruising. No surgical incisions      Palpation: (+) TTP of Thoracic and lumbar paraspinal muscles       ROM: (+) Limited in flexion, extension, lateral bending      (+) Facet loading on left      (-) Straight Leg Raise bilaterally  Hip Exam:   "    Inspection: No gross deformity or apparent leg length discrepancy      Palpation: No TTP      ROM: Not Limited in internal rotation, external rotation  Neurologic Exam:     Alert. Speech is fluent and appropriate.     Strength: Inconsistent MMT in legs, but appears at least 4+/5 throughout bilateral lower extremities on isolated exams, 5/5 in b/l upper limbs     Sensation: Grossly intact to light touch in right lower extremity. Absent sensation to light touch and pin prick in Left upper and lower limbs.     Reflexes: 2+ in right patella, 1+ in right achilles, 2+ in left patella, unable to elicit in left achilles     Tone: No abnormality appreciated in bilateral lower extremities     No Clonus     (-) Perkins bilaterally         Imaging:  - 06/23/2021 Lumbar MRI  Mild disc desiccation at L3-4 and L5-S1.  The cord ends at L1-L2.  Mild irregularity suggesting Schmorl's node or degenerative change along the superior aspect of L4.  There is no acute fracture.  No abnormal signal within the disc.  There is edema in the soft tissues posteriorly consistent recent procedure.  No enhancing lesions 1 mm bulge slightly asymmetric to the left at L3-4.  Incidentally is a 4 mm rounded density hypointense on T1 and T2 weighted images posterior to the thecal sac at the level of the injection felt to probably be a small bubble air.  No mass lesions no significant focal protrusion or spinal stenosis.  No epidural fluid collections to suggest an abscess or hematoma    -06/23/2021 Thoracic MRI  endplate degenerative changes along the superior aspect of T11.  Bone marrow signal is normal.  There is no enhancement of the cord.  Cord signal is normal.  At T7-8 small left paracentral protrusion effacing the cord small central bulge at T8-9    -06/23/2021 Cervical MRI  Cervical vertebral bodies are of normal height alignment.  The cord signal is normal.  There is no enhancement of the cord.  No epidural collections to suggest abscess or  hematoma.  No increased signal at the disc on the T2 images.  No focal central disc protrusions or cord compression or spinal stenosis.  No neural foramina narrowing.    Labs:  BMP  Lab Results   Component Value Date     06/23/2021    K 4.5 06/23/2021     06/23/2021    CO2 24 06/23/2021    BUN 15 06/23/2021    CREATININE 0.75 06/23/2021    CALCIUM 8.9 06/23/2021    ANIONGAP 12.0 02/11/2013    ESTGFRAFRICA >60 06/23/2021    EGFRNONAA >60 06/23/2021     Lab Results   Component Value Date    ALT 28 06/23/2021    AST 30 06/23/2021    ALKPHOS 65 06/23/2021    BILITOT 0.4 06/23/2021     Lab Results   Component Value Date     06/23/2021       Assessment:  Charlene Mohan is a 26 y.o. female with the following diagnoses based on history, exam, and imaging:    Problem List Items Addressed This Visit    None     Visit Diagnoses     BMI 40.0-44.9, adult    -  Primary    Relevant Orders    Ambulatory referral/consult to Bariatric Medicine    Radicular pain of thoracic region        Relevant Orders    Ambulatory referral/consult to [facility]    Somatization disorder        Relevant Orders    Ambulatory referral/consult to [facility]    Chronic pain syndrome        Relevant Orders    Ambulatory referral/consult to [facility]          This is a pleasant 26 y.o. lady presenting with:     - Chronic left thoracic back pain with thoracic radicular pain: MRI with disc bulge at T7-8 and T8-9. However, signs concerning for conversion disorder and potentially somatization disorder either as main reason for her pain, or contributing to the severity of her pain.    - At this point, I feel she has been managed too aggressively with many invasive procedures and feel we should exhaust conservative measures considering the potential somatization disorder. Prioritize: exercise, weight loss and PT.   - Comorbidities: BMI > 40, HTN, HLD, insomnia, PCOS, insulin  resistance, anxiety, depression    Treatment Plan:   - PT/OT/HEP: Referral to functional restoration program.    - Discussed benefits of exercise and weight loss for pain.   - Procedures: No procedures recommended at this time.    - Recommended she f/u with physician who placed her SCS. She is scheduled to follow-up with the provider who placed her SCS for evaluation in 2 weeks.   - Consider left T7 and T8 TF JOSÉ LUIS in future, but only after exhausting conservative measures.   - Medications: We have given her instructions on how to taper gabapentin as it gives minimal relief.  - Imaging: Reviewed.   - Labs: Reviewed.    - Referral to bariatric medicine     Follow Up: RTC as needed    Abdias Zhou MD  [employer] PM&R PGY-4     The patient was seen and examined with the resident. I agree with the above documentation, and have edited as necessary.       Amberly Ward M.D.  Interventional Pain Medicine / Physical Medicine & Rehabilitation    Disclaimer: This note was partly generated using dictation software which may occasionally result in transcription errors.

## 2022-07-27 ENCOUNTER — TELEPHONE (OUTPATIENT)
Dept: ADMINISTRATIVE | Facility: OTHER | Age: 27
End: 2022-07-27
Payer: COMMERCIAL

## 2022-07-27 NOTE — TELEPHONE ENCOUNTER
Left voice message for patient to return call to schedule appointment from referral to Functional Restoration department.  Haylie CONNORS [phone]

## 2022-07-28 ENCOUNTER — TELEPHONE (OUTPATIENT)
Dept: ADMINISTRATIVE | Facility: OTHER | Age: 27
End: 2022-07-28
Payer: COMMERCIAL

## 2022-07-28 NOTE — TELEPHONE ENCOUNTER
Left voice message for patient to return call to schedule appointment from referral to Functional Restoration department..  Haylie CONNORS [phone]

## 2022-08-05 ENCOUNTER — TELEPHONE (OUTPATIENT)
Dept: ADMINISTRATIVE | Facility: OTHER | Age: 27
End: 2022-08-05
Payer: COMMERCIAL

## 2022-08-17 ENCOUNTER — PATIENT MESSAGE (OUTPATIENT)
Dept: RHEUMATOLOGY | Facility: CLINIC | Age: 27
End: 2022-08-17
Payer: COMMERCIAL

## 2022-08-18 ENCOUNTER — TELEPHONE (OUTPATIENT)
Dept: RHEUMATOLOGY | Facility: CLINIC | Age: 27
End: 2022-08-18
Payer: COMMERCIAL

## 2022-08-18 NOTE — TELEPHONE ENCOUNTER
----- Message from Antoinette Hilton sent at 8/18/2022  1:25 PM CDT -----  Regarding: self [phone]  Type:  Patient Returning Call    Who Called:  self    Who Left Message for Patient: Francie     Does the patient know what this is regarding?: results    Would the patient rather a call back or a response via [facility]?  Call back    Best Call Back Number:[phone]

## 2022-08-19 NOTE — TELEPHONE ENCOUNTER
----- Message from Viadl Batista MD sent at 8/10/2022  9:33 AM CDT -----  Anti histone Antibody is weak positive this is seen at times with drug induced lupus, as long as your no longer taking agent this would not affect you. TPO antibody is positive need Endocrine evaluation. Rest of Connective tissue labs negative. Inflammatory markers stable likely due to obesity.   Dr. Batista

## 2022-08-22 ENCOUNTER — TELEPHONE (OUTPATIENT)
Dept: RHEUMATOLOGY | Facility: CLINIC | Age: 27
End: 2022-08-22
Payer: COMMERCIAL

## 2022-08-22 NOTE — TELEPHONE ENCOUNTER
Rt pt call and confirmed full name , d.ob . Reviewed recent labs results and imaging ordered by Dr. Batista .  Pt verbalized understanding .

## 2022-08-22 NOTE — TELEPHONE ENCOUNTER
----- Message from Brandy Venegas sent at 8/22/2022  2:37 PM CDT -----  Regarding: Self/  [phone]  Type: Patient Call Back    Who called:  Patient    What is the request in detail:  Patient returned staffs call and would like a call back please.  Thank you      Would the patient rather a call back or a response via [facility]?   Call back    Best call back number:  [phone] (home)         Thank you

## 2022-08-25 ENCOUNTER — OFFICE VISIT (OUTPATIENT)
Dept: ENDOCRINOLOGY | Facility: CLINIC | Age: 27
End: 2022-08-25
Payer: COMMERCIAL

## 2022-08-25 VITALS
HEIGHT: 65 IN | BODY MASS INDEX: 44.88 KG/M2 | DIASTOLIC BLOOD PRESSURE: 80 MMHG | WEIGHT: 269.38 LBS | SYSTOLIC BLOOD PRESSURE: 130 MMHG

## 2022-08-25 DIAGNOSIS — E06.3 HYPOTHYROIDISM DUE TO HASHIMOTO'S THYROIDITIS: Primary | ICD-10-CM

## 2022-08-25 DIAGNOSIS — E28.2 PCOS (POLYCYSTIC OVARIAN SYNDROME): ICD-10-CM

## 2022-08-25 DIAGNOSIS — E03.8 HYPOTHYROIDISM DUE TO HASHIMOTO'S THYROIDITIS: Primary | ICD-10-CM

## 2022-08-25 PROCEDURE — 4010F PR ACE/ARB THEARPY RXD/TAKEN: ICD-10-PCS | Mod: CPTII,S$GLB,, | Performed by: GENERAL ACUTE CARE HOSPITAL

## 2022-08-25 PROCEDURE — 4010F ACE/ARB THERAPY RXD/TAKEN: CPT | Mod: CPTII,S$GLB,, | Performed by: GENERAL ACUTE CARE HOSPITAL

## 2022-08-25 PROCEDURE — 99999 PR PBB SHADOW E&M-EST. PATIENT-LVL V: ICD-10-PCS | Mod: PBBFAC,,, | Performed by: GENERAL ACUTE CARE HOSPITAL

## 2022-08-25 PROCEDURE — 1160F RVW MEDS BY RX/DR IN RCRD: CPT | Mod: CPTII,S$GLB,, | Performed by: GENERAL ACUTE CARE HOSPITAL

## 2022-08-25 PROCEDURE — 1159F MED LIST DOCD IN RCRD: CPT | Mod: CPTII,S$GLB,, | Performed by: GENERAL ACUTE CARE HOSPITAL

## 2022-08-25 PROCEDURE — 3008F PR BODY MASS INDEX (BMI) DOCUMENTED: ICD-10-PCS | Mod: CPTII,S$GLB,, | Performed by: GENERAL ACUTE CARE HOSPITAL

## 2022-08-25 PROCEDURE — 3079F DIAST BP 80-89 MM HG: CPT | Mod: CPTII,S$GLB,, | Performed by: GENERAL ACUTE CARE HOSPITAL

## 2022-08-25 PROCEDURE — 3075F PR MOST RECENT SYSTOLIC BLOOD PRESS GE 130-139MM HG: ICD-10-PCS | Mod: CPTII,S$GLB,, | Performed by: GENERAL ACUTE CARE HOSPITAL

## 2022-08-25 PROCEDURE — 99204 PR OFFICE/OUTPT VISIT, NEW, LEVL IV, 45-59 MIN: ICD-10-PCS | Mod: S$GLB,,, | Performed by: GENERAL ACUTE CARE HOSPITAL

## 2022-08-25 PROCEDURE — 3075F SYST BP GE 130 - 139MM HG: CPT | Mod: CPTII,S$GLB,, | Performed by: GENERAL ACUTE CARE HOSPITAL

## 2022-08-25 PROCEDURE — 99204 OFFICE O/P NEW MOD 45 MIN: CPT | Mod: S$GLB,,, | Performed by: GENERAL ACUTE CARE HOSPITAL

## 2022-08-25 PROCEDURE — 3008F BODY MASS INDEX DOCD: CPT | Mod: CPTII,S$GLB,, | Performed by: GENERAL ACUTE CARE HOSPITAL

## 2022-08-25 PROCEDURE — 1160F PR REVIEW ALL MEDS BY PRESCRIBER/CLIN PHARMACIST DOCUMENTED: ICD-10-PCS | Mod: CPTII,S$GLB,, | Performed by: GENERAL ACUTE CARE HOSPITAL

## 2022-08-25 PROCEDURE — 99999 PR PBB SHADOW E&M-EST. PATIENT-LVL V: CPT | Mod: PBBFAC,,, | Performed by: GENERAL ACUTE CARE HOSPITAL

## 2022-08-25 PROCEDURE — 1159F PR MEDICATION LIST DOCUMENTED IN MEDICAL RECORD: ICD-10-PCS | Mod: CPTII,S$GLB,, | Performed by: GENERAL ACUTE CARE HOSPITAL

## 2022-08-25 PROCEDURE — 3079F PR MOST RECENT DIASTOLIC BLOOD PRESSURE 80-89 MM HG: ICD-10-PCS | Mod: CPTII,S$GLB,, | Performed by: GENERAL ACUTE CARE HOSPITAL

## 2022-08-25 NOTE — PROGRESS NOTES
Subjective:      Patient ID: Charlene Mohan is a 27 y.o. female.    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     With regards to hypothyroidism:   -Type: (Hashimoto's)       Latest Reference Range & Units 11/22/10 11:15 01/28/12 08:10 02/11/13 08:45 04/09/13 07:44 07/27/22 09:54   TSH 0.400 - 4.000 uIU/mL 2.16 2.163 3.099 1.634 1.658   Free T4 0.71 - 1.51 ng/dl 1.13       Thyroperoxidase Antibodies <6.0 IU/mL  < 6.0   19.7 (H)   PTH 10 - 65 pg/ml  21          Mildly positive TPO Ab      TSH has always been WNL     Not on LT4     Previously on LT4? DENIES     -Hypothyroid symptoms? Having symptoms of fatigue, cold intolerance and constipation         Lab Results   Component Value Date    TSH 1.658 07/27/2022      -  Lab Results   Component Value Date    TSH 1.658 07/27/2022    FREET4 1.13 11/22/2010            -Family history of Thyroid disease:  Mother had Grave's disease   -Thyromegaly?  DENIES         With regards to PCOS:     History of irregular cycles     Pt has and IUD     ON Aldactone 100mg BID for hirsutism    (No active pregnancy plans)     Hirsutism on exam     Not on Metformin but was previously on metformin many years ago      Latest Reference Range & Units 04/21/11 10:49 08/09/11 11:23   Testosterone, Free pg/mL <0.2 <0.2   Testosterone, Total 5 - 73 ng/dl 13 25         A1C is WNL       ROS:   As above    Objective:     There were no vitals taken for this visit.  BP  Readings from Last 3 Encounters:   07/26/22 117/75   07/26/22 125/84   07/14/22 123/77     Wt Readings from Last 1 Encounters:   07/26/22 1411 123.2 kg (271 lb 9.7 oz)     There is no height or weight on file to calculate BMI.      Physical Exam  Vitals reviewed.   Constitutional:       General: She is not in acute distress.     Appearance: Normal appearance. She is well-developed. She is not ill-appearing.   HENT:      Nose: Nose normal. No rhinorrhea.      Mouth/Throat:      Mouth: Mucous membranes are moist.   Eyes:      Extraocular Movements: Extraocular movements intact.      Pupils: Pupils are equal, round, and reactive to light.      Comments: No proptosis, No lid lag, No conjunctival erythema    Neck:      Thyroid: No thyromegaly.      Trachea: No tracheal deviation.      Comments: No thyromegaly or Thyroid nodules palpated on exam   Cardiovascular:      Rate and Rhythm: Normal rate and regular rhythm.      Pulses: Normal pulses.   Pulmonary:      Effort: Pulmonary effort is normal.      Breath sounds: Normal breath sounds.   Abdominal:      Palpations: Abdomen is soft. There is no mass.      Tenderness: There is no abdominal tenderness.      Hernia: No hernia is present.      Comments: No scar tissue, No Violaceous striae    Musculoskeletal:         General: No swelling.      Cervical back: Neck supple. No tenderness.      Right lower leg: No edema.      Left lower leg: No edema.   Skin:     General: Skin is warm.      Findings: No rash.             Comments: Hirsutism, upper lip   Neurological:      General: No focal deficit present.      Mental Status: She is alert and oriented to person, place, and time.   Psychiatric:         Mood and Affect: Mood normal.         Judgment: Judgment normal.                Lab Review:   Lab Results   Component Value Date    HGBA1C 5.2 02/11/2013     Lab Results   Component Value Date    CHOL 206 (H) 02/11/2013    HDL 57 02/11/2013    LDLCALC 94.0 02/11/2013    TRIG 276  (H) 02/11/2013    CHOLHDL 27.7 02/11/2013     Lab Results   Component Value Date     07/27/2022    K 4.5 07/27/2022     07/27/2022    CO2 24 07/27/2022    GLU 87 07/27/2022    BUN 10 07/27/2022    CREATININE 0.7 07/27/2022    CALCIUM 9.3 07/27/2022    PROT 7.1 07/27/2022    ALBUMIN 3.9 07/27/2022    BILITOT 0.2 07/27/2022    ALKPHOS 82 07/27/2022    AST 8 (L) 07/27/2022    ALT 14 07/27/2022    ANIONGAP 10 07/27/2022    ESTGFRAFRICA >60.0 07/27/2022    EGFRNONAA >60.0 07/27/2022    TSH 1.658 07/27/2022     Vit D, 25-Hydroxy   Date Value Ref Range Status   07/27/2022 67 30 - 96 ng/mL Final     Comment:     Vitamin D deficiency.........<10 ng/mL                              Vitamin D insufficiency......10-29 ng/mL       Vitamin D sufficiency........> or equal to 30 ng/mL  Vitamin D toxicity............>100 ng/mL         Assessment and Plan     Hypothyroidism due to Hashimoto's thyroiditis      TSH at goal     No indication for therapy at this time     Send TSH today for monitoring     Will monitor TSH q 6 months as patient is at risk for hypothyroidism in the future       PCOS (polycystic ovarian syndrome)    ON Aldactone 100mg BID  For hirsutism  (Discussed risk of fetal feminization if she becomes pregnant)     On IUD     Following with GYN     Will send Glucose tolerance test to eval for insulin resistance and need for metformin     Will monitor

## 2022-08-25 NOTE — PATIENT INSTRUCTIONS
At this time you do not need medications or thyroid hormone replacement but due to positive thyroid antibodies we need to do further evaluation and at some point you may need thyroid hormone replacement.      We will repeat thyroid levels and sed a complete thyroid antibody panel for further investigation.     Due to your history of PCOS I want to do a 2 hr glucose tolerance test to screen for insulin resistance.     Pending on evaluation I will contact you with results and next steps to follow.     If any questions feel free to contact me.     Have a nice day.     Sincerely,       Deonte Dahl MD   Endocrinology   8/25/2022 3:38 PM

## 2022-08-29 ENCOUNTER — PATIENT MESSAGE (OUTPATIENT)
Dept: ENDOCRINOLOGY | Facility: CLINIC | Age: 27
End: 2022-08-29
Payer: COMMERCIAL

## 2022-08-29 DIAGNOSIS — E28.2 PCOS (POLYCYSTIC OVARIAN SYNDROME): Primary | ICD-10-CM

## 2022-08-29 RX ORDER — METFORMIN HYDROCHLORIDE 500 MG/1
500 TABLET, EXTENDED RELEASE ORAL 2 TIMES DAILY WITH MEALS
Qty: 60 TABLET | Refills: 11 | Status: SHIPPED | OUTPATIENT
Start: 2022-08-29 | End: 2023-05-17

## 2022-08-29 NOTE — TELEPHONE ENCOUNTER
TFTs are WNL.     No indication for Thyroid hormone replacement therapy.        Glucose tolerance with evidence of insulin resistance.     Will offer metformin.

## 2022-09-14 ENCOUNTER — PATIENT MESSAGE (OUTPATIENT)
Dept: ENDOCRINOLOGY | Facility: CLINIC | Age: 27
End: 2022-09-14
Payer: COMMERCIAL

## 2022-09-14 DIAGNOSIS — H53.8 BLURRY VISION: Primary | ICD-10-CM

## 2022-09-19 ENCOUNTER — PATIENT MESSAGE (OUTPATIENT)
Dept: ENDOCRINOLOGY | Facility: CLINIC | Age: 27
End: 2022-09-19
Payer: COMMERCIAL

## 2022-10-04 PROBLEM — Z71.89 ENCOUNTER TO DISCUSS TREATMENT OPTIONS: Status: ACTIVE | Noted: 2022-10-04

## 2022-10-04 PROBLEM — T14.8XXA BRUISING: Status: ACTIVE | Noted: 2022-10-04

## 2022-10-04 PROBLEM — Z71.2 ENCOUNTER TO DISCUSS TEST RESULTS: Status: ACTIVE | Noted: 2022-10-04

## 2022-10-07 ENCOUNTER — PATIENT MESSAGE (OUTPATIENT)
Dept: ENDOCRINOLOGY | Facility: CLINIC | Age: 27
End: 2022-10-07
Payer: COMMERCIAL

## 2022-10-07 DIAGNOSIS — R06.02 SOB (SHORTNESS OF BREATH): Primary | ICD-10-CM

## 2022-10-07 RX ORDER — SPIRONOLACTONE 100 MG/1
100 TABLET, FILM COATED ORAL 2 TIMES DAILY
Qty: 90 TABLET | Refills: 3 | Status: SHIPPED | OUTPATIENT
Start: 2022-10-07 | End: 2023-01-23 | Stop reason: SDUPTHER

## 2022-10-07 RX ORDER — SPIRONOLACTONE 100 MG/1
100 TABLET, FILM COATED ORAL 2 TIMES DAILY
Qty: 90 TABLET | Refills: 3 | Status: CANCELLED | OUTPATIENT
Start: 2022-10-07

## 2022-10-20 ENCOUNTER — OFFICE VISIT (OUTPATIENT)
Dept: OBSTETRICS AND GYNECOLOGY | Facility: CLINIC | Age: 27
End: 2022-10-20
Payer: COMMERCIAL

## 2022-10-20 VITALS
DIASTOLIC BLOOD PRESSURE: 74 MMHG | BODY MASS INDEX: 44.79 KG/M2 | WEIGHT: 269.19 LBS | SYSTOLIC BLOOD PRESSURE: 108 MMHG

## 2022-10-20 DIAGNOSIS — E28.2 POLYCYSTIC OVARIES: ICD-10-CM

## 2022-10-20 DIAGNOSIS — Z11.3 SCREENING EXAMINATION FOR STD (SEXUALLY TRANSMITTED DISEASE): ICD-10-CM

## 2022-10-20 DIAGNOSIS — Z12.4 PAP SMEAR FOR CERVICAL CANCER SCREENING: ICD-10-CM

## 2022-10-20 DIAGNOSIS — Z01.419 ENCOUNTER FOR ANNUAL ROUTINE GYNECOLOGICAL EXAMINATION: Primary | ICD-10-CM

## 2022-10-20 PROCEDURE — 1159F MED LIST DOCD IN RCRD: CPT | Mod: CPTII,S$GLB,, | Performed by: OBSTETRICS & GYNECOLOGY

## 2022-10-20 PROCEDURE — 3074F PR MOST RECENT SYSTOLIC BLOOD PRESSURE < 130 MM HG: ICD-10-PCS | Mod: CPTII,S$GLB,, | Performed by: OBSTETRICS & GYNECOLOGY

## 2022-10-20 PROCEDURE — 99999 PR PBB SHADOW E&M-EST. PATIENT-LVL IV: CPT | Mod: PBBFAC,,, | Performed by: OBSTETRICS & GYNECOLOGY

## 2022-10-20 PROCEDURE — 87591 N.GONORRHOEAE DNA AMP PROB: CPT | Performed by: OBSTETRICS & GYNECOLOGY

## 2022-10-20 PROCEDURE — 4010F ACE/ARB THERAPY RXD/TAKEN: CPT | Mod: CPTII,S$GLB,, | Performed by: OBSTETRICS & GYNECOLOGY

## 2022-10-20 PROCEDURE — 99385 PR PREVENTIVE VISIT,NEW,18-39: ICD-10-PCS | Mod: S$GLB,,, | Performed by: OBSTETRICS & GYNECOLOGY

## 2022-10-20 PROCEDURE — 4010F PR ACE/ARB THEARPY RXD/TAKEN: ICD-10-PCS | Mod: CPTII,S$GLB,, | Performed by: OBSTETRICS & GYNECOLOGY

## 2022-10-20 PROCEDURE — 88175 CYTOPATH C/V AUTO FLUID REDO: CPT | Performed by: OBSTETRICS & GYNECOLOGY

## 2022-10-20 PROCEDURE — 81514 NFCT DS BV&VAGINITIS DNA ALG: CPT | Performed by: OBSTETRICS & GYNECOLOGY

## 2022-10-20 PROCEDURE — 87491 CHLMYD TRACH DNA AMP PROBE: CPT | Performed by: OBSTETRICS & GYNECOLOGY

## 2022-10-20 PROCEDURE — 99385 PREV VISIT NEW AGE 18-39: CPT | Mod: S$GLB,,, | Performed by: OBSTETRICS & GYNECOLOGY

## 2022-10-20 PROCEDURE — 99999 PR PBB SHADOW E&M-EST. PATIENT-LVL IV: ICD-10-PCS | Mod: PBBFAC,,, | Performed by: OBSTETRICS & GYNECOLOGY

## 2022-10-20 PROCEDURE — 3078F PR MOST RECENT DIASTOLIC BLOOD PRESSURE < 80 MM HG: ICD-10-PCS | Mod: CPTII,S$GLB,, | Performed by: OBSTETRICS & GYNECOLOGY

## 2022-10-20 PROCEDURE — 3074F SYST BP LT 130 MM HG: CPT | Mod: CPTII,S$GLB,, | Performed by: OBSTETRICS & GYNECOLOGY

## 2022-10-20 PROCEDURE — 3078F DIAST BP <80 MM HG: CPT | Mod: CPTII,S$GLB,, | Performed by: OBSTETRICS & GYNECOLOGY

## 2022-10-20 PROCEDURE — 1159F PR MEDICATION LIST DOCUMENTED IN MEDICAL RECORD: ICD-10-PCS | Mod: CPTII,S$GLB,, | Performed by: OBSTETRICS & GYNECOLOGY

## 2022-10-20 NOTE — PROGRESS NOTES
Chief Complaint   Patient presents with    Well Woman       HISTORY OF PRESENT ILLNESS:   Charlene Mohan is a 27 y.o. female  with PCOS who presents for well woman exam.  No LMP recorded (lmp unknown). (Menstrual status: Birth Control)..  She has no complaints.  Cycles are  irregular. Had lyletta placed 3 years ago. Desires STD testing.      Past Medical History:   Diagnosis Date    Dyslipidemia     Dysmetabolic syndrome X     Essential hypertension, benign     History of polycystic ovarian syndrome     Hypothyroidism     Obesity        Past Surgical History:   Procedure Laterality Date    ADENOIDECTOMY      SPINAL CORD STIMULATOR IMPLANT      TONSILLECTOMY      WISDOM TOOTH EXTRACTION         Social History     Socioeconomic History    Marital status:    Tobacco Use    Smoking status: Never    Smokeless tobacco: Never   Substance and Sexual Activity    Alcohol use: No    Drug use: No    Sexual activity: Yes     Partners: Male     Birth control/protection: I.U.D.   Social History Narrative    Lives at home with parents, 12 yo brother. No pets. Attending college in [place], studying Mathematics and Computer Science. Denies alcohol, tobacco, drug use. Denies being sexually active.       Family History   Problem Relation Age of Onset    Hypertension Paternal Grandfather     Diabetes Paternal Grandfather     Colon cancer Paternal Grandmother     Hypertension Paternal Grandmother     Diabetes Paternal Grandmother     Diabetes Maternal Grandmother     Hypertension Father     Hyperlipidemia Father     Thyroid disease Mother     Breast cancer Neg Hx     Ovarian cancer Neg Hx        OB History    Para Term  AB Living   0             SAB IAB Ectopic Multiple Live Births                   COMPREHENSIVE GYN HISTORY:  PAP History: Denies abnormal Paps  Infection History: Denies STDs. Denies PID.  Benign History:Denies uterine fibroids. Denies ovarian cysts. Denies endometriosis has PCOS   Cancer History:  Denies cervical cancer. Denies uterine cancer or hyperplasia. Denies ovarian cancer. Denies vulvar cancer or pre-cancer. Denies vaginal cancer or pre-cancer. Denies breast cancer. Denies colon cancer.  Cycle: 12/irregular   Hasn't had HPV vaccine   Dx with PCOS at 11 yo sees endocrine who is managing her pre-dm and monitoring her metabolic issues       ROS:  GENERAL: Denies weight gain or weight loss. Feeling well overall.   SKIN: Denies rash or lesions.   HEAD: Denies headache.   NODES: Denies enlarged lymph nodes.   CHEST: Denies shortness of breath.   ABDOMEN: No abdominal pain, constipation, diarrhea, nausea, vomiting or rectal bleeding.   URINARY: No frequency, dysuria, hematuria, or burning on urination.  REPRODUCTIVE: See HPI.   BREASTS: The patient denies pain, lumps, or nipple discharge.       /74   Wt 122.1 kg (269 lb 2.9 oz)   LMP  (LMP Unknown)   BMI 44.79 kg/m²     APPEARANCE: Well nourished, well developed, in no acute distress.  NECK: Neck symmetric   ABDOMEN: Soft. No tenderness or masses. No hernias. No hepatosplenomegaly.  BREASTS: Symmetrical, no skin changes or visible lesions. No palpable masses, nipple discharge or adenopathy bilaterally.  PELVIC:   VULVA: No lesions. Normal female genitalia.  URETHRAL MEATUS: Normal size and location, no lesions, no prolapse.  URETHRA: No masses, tenderness, prolapse or scarring.  VAGINA: Moist and well rugated, no discharge, no significant cystocele or rectocele.  CERVIX: No lesions and discharge. Iud strings seen   UTERUS: Normal size, regular shape, mobile, non-tender, bladder base nontender.  ADNEXA: No masses or tenderness.      1. Encounter for annual routine gynecological examination    2. Pap smear for cervical cancer screening    3. Screening examination for STD (sexually transmitted disease)    4. Polycystic ovaries        Plan:  Routine gyn s/p normal breast exam. Pap without HPV cotesting ordered . Dsicussed HPV vaccine and she declines  today. STD testing: GC/CT/trich, syphilis, HBV/HCV and HIV ordered. IUD in place, due to replace in 2025      F/u in 1 yr or PRN

## 2022-10-21 LAB
BACTERIAL VAGINOSIS DNA: NEGATIVE
C TRACH DNA SPEC QL NAA+PROBE: NOT DETECTED
CANDIDA GLABRATA DNA: NEGATIVE
CANDIDA KRUSEI DNA: NEGATIVE
CANDIDA RRNA VAG QL PROBE: NEGATIVE
N GONORRHOEA DNA SPEC QL NAA+PROBE: NOT DETECTED
T VAGINALIS RRNA GENITAL QL PROBE: NEGATIVE

## 2022-10-24 ENCOUNTER — PATIENT MESSAGE (OUTPATIENT)
Dept: OBSTETRICS AND GYNECOLOGY | Facility: CLINIC | Age: 27
End: 2022-10-24
Payer: COMMERCIAL

## 2022-10-27 ENCOUNTER — TELEPHONE (OUTPATIENT)
Dept: OPHTHALMOLOGY | Facility: CLINIC | Age: 27
End: 2022-10-27
Payer: COMMERCIAL

## 2022-10-27 LAB
FINAL PATHOLOGIC DIAGNOSIS: NORMAL
Lab: NORMAL

## 2022-10-27 NOTE — TELEPHONE ENCOUNTER
----- Message from Vidal Quintana sent at 10/27/2022 11:13 AM CDT -----  Contact: @Charlene [phone]  Pt calling because a referral was sent in regarding corneal issues and wasn't in the system. Please give her a call back to further assist.

## 2022-11-02 ENCOUNTER — PATIENT MESSAGE (OUTPATIENT)
Dept: OBSTETRICS AND GYNECOLOGY | Facility: CLINIC | Age: 27
End: 2022-11-02
Payer: COMMERCIAL

## 2022-11-10 PROBLEM — K76.0 STEATOSIS OF LIVER: Status: ACTIVE | Noted: 2022-11-10

## 2022-12-14 ENCOUNTER — OFFICE VISIT (OUTPATIENT)
Dept: OPHTHALMOLOGY | Facility: CLINIC | Age: 27
End: 2022-12-14
Payer: COMMERCIAL

## 2022-12-14 DIAGNOSIS — H52.223 REGULAR ASTIGMATISM OF BOTH EYES: Primary | ICD-10-CM

## 2022-12-14 PROCEDURE — 99999 PR PBB SHADOW E&M-EST. PATIENT-LVL II: CPT | Mod: PBBFAC,,, | Performed by: OPHTHALMOLOGY

## 2022-12-14 PROCEDURE — 92004 PR EYE EXAM, NEW PATIENT,COMPREHESV: ICD-10-PCS | Mod: S$GLB,,, | Performed by: OPHTHALMOLOGY

## 2022-12-14 PROCEDURE — 99999 PR PBB SHADOW E&M-EST. PATIENT-LVL II: ICD-10-PCS | Mod: PBBFAC,,, | Performed by: OPHTHALMOLOGY

## 2022-12-14 PROCEDURE — 92004 COMPRE OPH EXAM NEW PT 1/>: CPT | Mod: S$GLB,,, | Performed by: OPHTHALMOLOGY

## 2022-12-14 NOTE — PROGRESS NOTES
HPI     Cornea            Comments: Patient Charlene Mohan is a 27 year old female.          Comments    Pt referred by Dr. Deonte Goodman M.D. for evaluation for corneal   degeneration OU.    LINDSEY: 09/20/2022 with     Meds:    POHx: (+)possible corneal degeneration OU                Last edited by Trinity Brewer on 12/14/2022 10:21 AM.            Assessment /Plan     For exam results, see Encounter Report.    Regular astigmatism of both eyes    High WTR OU, but no signs of KCN on Pentacam at this time  Rec Annual monitoring

## 2023-05-08 ENCOUNTER — TELEPHONE (OUTPATIENT)
Dept: ENDOCRINOLOGY | Facility: CLINIC | Age: 28
End: 2023-05-08
Payer: COMMERCIAL

## 2023-05-08 NOTE — TELEPHONE ENCOUNTER
----- Message from Deon Puckett sent at 5/8/2023  2:27 PM CDT -----  Type:  Sooner Apoointment Request    Caller is requesting a sooner appointment.  Caller declined first available appointment listed below.  Caller will not accept being placed on the waitlist and is requesting a message be sent to doctor.  Name of Caller: Charlene  When is the first available appointment? No appt available between now & 9-  Symptoms: 6 month f/u  Would the patient rather a call back or a response via [facility]? [facility]  Best Call Back Number: [phone]  Additional Information:  no

## 2023-05-17 DIAGNOSIS — E28.2 PCOS (POLYCYSTIC OVARIAN SYNDROME): ICD-10-CM

## 2023-05-17 RX ORDER — METFORMIN HYDROCHLORIDE 500 MG/1
TABLET, EXTENDED RELEASE ORAL
Qty: 60 TABLET | Refills: 11 | Status: SHIPPED | OUTPATIENT
Start: 2023-05-17 | End: 2023-09-14 | Stop reason: SDUPTHER

## 2023-07-27 ENCOUNTER — OFFICE VISIT (OUTPATIENT)
Dept: GASTROENTEROLOGY | Facility: CLINIC | Age: 28
End: 2023-07-27
Payer: COMMERCIAL

## 2023-07-27 VITALS — BODY MASS INDEX: 41.99 KG/M2 | HEIGHT: 65 IN | WEIGHT: 252 LBS

## 2023-07-27 DIAGNOSIS — K21.9 GASTROESOPHAGEAL REFLUX DISEASE, UNSPECIFIED WHETHER ESOPHAGITIS PRESENT: Primary | ICD-10-CM

## 2023-07-27 DIAGNOSIS — K59.04 CHRONIC IDIOPATHIC CONSTIPATION: ICD-10-CM

## 2023-07-27 PROCEDURE — 99204 OFFICE O/P NEW MOD 45 MIN: CPT | Mod: S$GLB,,, | Performed by: NURSE PRACTITIONER

## 2023-07-27 PROCEDURE — 99204 PR OFFICE/OUTPT VISIT, NEW, LEVL IV, 45-59 MIN: ICD-10-PCS | Mod: S$GLB,,, | Performed by: NURSE PRACTITIONER

## 2023-07-27 PROCEDURE — 99999 PR PBB SHADOW E&M-EST. PATIENT-LVL IV: CPT | Mod: PBBFAC,,, | Performed by: NURSE PRACTITIONER

## 2023-07-27 PROCEDURE — 99999 PR PBB SHADOW E&M-EST. PATIENT-LVL IV: ICD-10-PCS | Mod: PBBFAC,,, | Performed by: NURSE PRACTITIONER

## 2023-07-27 PROCEDURE — 3008F BODY MASS INDEX DOCD: CPT | Mod: CPTII,S$GLB,, | Performed by: NURSE PRACTITIONER

## 2023-07-27 PROCEDURE — 4010F ACE/ARB THERAPY RXD/TAKEN: CPT | Mod: CPTII,S$GLB,, | Performed by: NURSE PRACTITIONER

## 2023-07-27 PROCEDURE — 1159F PR MEDICATION LIST DOCUMENTED IN MEDICAL RECORD: ICD-10-PCS | Mod: CPTII,S$GLB,, | Performed by: NURSE PRACTITIONER

## 2023-07-27 PROCEDURE — 4010F PR ACE/ARB THEARPY RXD/TAKEN: ICD-10-PCS | Mod: CPTII,S$GLB,, | Performed by: NURSE PRACTITIONER

## 2023-07-27 PROCEDURE — 1159F MED LIST DOCD IN RCRD: CPT | Mod: CPTII,S$GLB,, | Performed by: NURSE PRACTITIONER

## 2023-07-27 PROCEDURE — 3008F PR BODY MASS INDEX (BMI) DOCUMENTED: ICD-10-PCS | Mod: CPTII,S$GLB,, | Performed by: NURSE PRACTITIONER

## 2023-07-27 RX ORDER — ESOMEPRAZOLE MAGNESIUM 40 MG/1
40 CAPSULE, DELAYED RELEASE ORAL
Qty: 30 CAPSULE | Refills: 11 | Status: SHIPPED | OUTPATIENT
Start: 2023-07-27 | End: 2023-09-20 | Stop reason: ALTCHOICE

## 2023-07-27 NOTE — PROGRESS NOTES
GASTROENTEROLOGY CLINIC NOTE    Chief Complaint: The primary encounter diagnosis was Gastroesophageal reflux disease, unspecified whether esophagitis present. A diagnosis of Chronic idiopathic constipation was also pertinent to this visit.  Referring provider/PCP: Debi Campos MD    Charlene Mohan is a 27 y.o. female who is a new patient to me with a PMH that's significant for PCOS, nausea, and abdominal pain and is accompanied by her .  She is here today to establish care for reflux and constipation. Reflux has been ongoing for about 10 years with dysphagia occurring over the last six months. She has had EGD a few years ago; unsure of findings other than irritation in the stomach.   Currently taking Protonix 40mg BID.     GERD  When do symptoms occur: after eating and randomly throughout the day  Nausea/Vomiting: Nausea occurring daily and is worse in am and after eating.   Abdominal Pain: epigastric  Nocturnal symptoms: yes  Typical Symptoms    Pyrosis Yes   Accompanied by substernal burning and burning in back of throat   Regurgitation No regurgitation but does report nausea   Water Brash yes   Dysphagia/Odynophagia Feels food sticking in mouth and throat  Occurring about every other day  Drinking water helps move down  Occurring with drier foods and liquids  Coughing with eating  No odynophagia     Atypical Symptoms    Hoarseness/Cough no   Throat Clearing no   Chest Pain Substernal    Asthma yes     Treatments: Protonix (currently taking), omeprazole; Promethazine and zofran for nausea  Triggers: no specific triggers identified  Caffeine: 1 cup coffee daily  ETOH: no  NSAIDs: no    Constipation  How Long: About 8 months   Frequency of Bowel Movements: Appx once a week   Straining: Yes  Stool is hard in consistency   Complete Evacuation:    Hematochezia: no   Abdominal Pain: no   Unexplained Weight Loss/Change in Bowel Habits: no   Water Intake: Tries to get in 8 cups   Daily Fiber Supplement: no      Treatments: stool softeners    Anticoagulation or Antiplatelet: No    History of H.pylori: no  H.pylori Treatment:  Prior Upper Endoscopy: several years ago; irritation in stomach  Prior Colonoscopy: no  Family h/o Colon Cancer: paternal gm  Family h/o Crohn's Disease or Ulcerative Colitis: No  Family h/o Celiac Sprue: No  Abdominal Surgeries: no    Review of Systems   Constitutional:  Negative for weight loss.   HENT:  Negative for sore throat.    Eyes:  Negative for blurred vision.   Respiratory:  Negative for cough.    Cardiovascular:  Negative for chest pain.   Gastrointestinal:  Positive for abdominal pain, constipation, heartburn and nausea. Negative for blood in stool, diarrhea, melena and vomiting.   Genitourinary:  Negative for dysuria.   Musculoskeletal:  Negative for myalgias.   Skin:  Negative for rash.   Neurological:  Negative for headaches.   Endo/Heme/Allergies:  Negative for environmental allergies.   Psychiatric/Behavioral:  Negative for suicidal ideas. The patient is not nervous/anxious.        Past Medical History: has a past medical history of Dyslipidemia, Dysmetabolic syndrome X, Essential hypertension, benign, History of polycystic ovarian syndrome, Hypothyroidism, and Obesity.    Past Surgical History: has a past surgical history that includes Tonsillectomy; Adenoidectomy; [place] tooth extraction; and Spinal cord stimulator implant.    Family History:family history includes Colon cancer in her paternal grandmother; Diabetes in her maternal grandmother, paternal grandfather, and paternal grandmother; Hyperlipidemia in her father; Hypertension in her father, paternal grandfather, and paternal grandmother; Thyroid disease in her mother.    Allergies:   Review of patient's allergies indicates:   Allergen Reactions    Diclofenac     Levalbuterol hcl      Other reaction(s): Headache       Social History: reports that she has never smoked. She has never used smokeless tobacco. She reports that  she does not drink alcohol and does not use drugs.    Home medications:   Current Outpatient Medications on File Prior to Visit   Medication Sig Dispense Refill    albuterol (PROVENTIL/VENTOLIN HFA) 90 mcg/actuation inhaler INHALE 2 PUFFS BY MOUTH EVERY 4 TO 6 HOURS AS NEEDED FOR SHORTNESS OF BREATH OR WHEEZING      atorvastatin (LIPITOR) 10 MG tablet Take 10 mg by mouth once daily.      BREO ELLIPTA 100-25 mcg/dose diskus inhaler Inhale 1 puff into the lungs once daily.      buPROPion (WELLBUTRIN XL) 300 MG 24 hr tablet Take 1 tablet by mouth once daily.      busPIRone (BUSPAR) 7.5 MG tablet 15 mg 3 (three) times daily.      gabapentin (NEURONTIN) 300 MG capsule Take 600 mg by mouth 3 (three) times daily.      HYDROcodone-acetaminophen (NORCO) 5-325 mg per tablet Take 1 tablet by mouth every 8 (eight) hours as needed for Pain. (Patient taking differently: Take 7.5-325 tablets by mouth once daily.) 15 tablet 0    lisinopriL 10 MG tablet Take 10 mg by mouth once daily.      metFORMIN (GLUCOPHAGE-XR) 500 MG ER 24hr tablet TAKE 1 TABLET(500 MG) BY MOUTH TWICE DAILY WITH MEALS 60 tablet 11    metoprolol succinate (TOPROL-XL) 50 MG 24 hr tablet Take 50 mg by mouth once daily.      montelukast (SINGULAIR) 10 mg tablet Take 10 mg by mouth once daily.      mupirocin (BACTROBAN) 2 % ointment APPLY TOPICALLY TO THE AFFECTED AREA TWICE DAILY MIX W/ NYSTATIN AND APPLY TO NAIL FOLDS APPLY TO BIOPSY SITE      omalizumab (XOLAIR) 150 mg/mL injection       ondansetron (ZOFRAN) 8 MG tablet Take 8 mg by mouth 2 (two) times daily as needed.      pregabalin (LYRICA) 150 MG capsule Take 150 mg by mouth 3 (three) times daily.      spironolactone (ALDACTONE) 100 MG tablet TAKE 1 TABLET(100 MG) BY MOUTH TWICE DAILY 90 tablet 3    tiZANidine (ZANAFLEX) 4 MG tablet       traZODone (DESYREL) 150 MG tablet Take 300 mg by mouth once daily.      venlafaxine (EFFEXOR-XR) 150 MG Cp24 Take 2 capsules by mouth once daily.      CELEBREX 200 mg  "capsule Take 200 mg by mouth 2 (two) times daily.      hydrocortisone 2.5 % cream APPLY TOPICALLY TO THE AFFECTED AREA EVERY DAY      nystatin (MYCOSTATIN) cream APPLY TOPICALLY TO THE AFFECTED AREA DAILY MIX W/ MUPIROCIN AND APPLY TO NAIL FOLDS      sumatriptan (IMITREX) 100 MG tablet Take 1 tablet (100 mg total) by mouth daily as needed for Migraine. 9 tablet 0     No current facility-administered medications on file prior to visit.       Vital signs:  Ht 5' 5" (1.651 m)   Wt 114.3 kg (251 lb 15.8 oz)   BMI 41.93 kg/m²     Physical Exam  Vitals reviewed.   Constitutional:       Appearance: Normal appearance.   HENT:      Head: Normocephalic.   Pulmonary:      Effort: Pulmonary effort is normal. No respiratory distress.   Abdominal:      General: There is no distension.      Palpations: Abdomen is soft.      Tenderness: There is no abdominal tenderness.   Skin:     General: Skin is warm.   Neurological:      Mental Status: She is alert and oriented to person, place, and time.   Psychiatric:         Behavior: Behavior normal.         Thought Content: Thought content normal.         Routine labs:  Lab Results   Component Value Date    WBC 9.50 11/08/2022    HGB 12.1 11/08/2022    HCT 35.0 (L) 11/08/2022    MCV 87 11/08/2022     11/08/2022     Lab Results   Component Value Date    INR 1.0 10/04/2022     Lab Results   Component Value Date    IRON 62 10/04/2022    FERRITIN 62.7 10/04/2022    TIBC 309 10/04/2022     Lab Results   Component Value Date     11/08/2022    K 4.3 11/08/2022     11/08/2022    CO2 24 11/08/2022    BUN 5 (L) 11/08/2022    CREATININE 0.49 (L) 11/08/2022     Lab Results   Component Value Date    ALBUMIN 4.2 11/08/2022    ALT 24 11/08/2022    AST 31 11/08/2022    ALKPHOS 77 11/08/2022    BILITOT 0.2 11/08/2022     No results found for: "GLUCOSE"  Lab Results   Component Value Date    TSH 2.045 08/26/2022     Lab Results   Component Value Date    CALCIUM 9.0 11/08/2022    PHOS " 2.8 04/21/2011       Imaging:  US Abdomen Complete  Narrative: EXAMINATION:  US ABDOMEN COMPLETE    CLINICAL HISTORY:  Bruising    COMPARISON:  None    FINDINGS:  The liver exhibits a heterogeneous echotexture with increased echogenicity, suggesting fatty change.  Portal vein is patent with hepatopetal flow.  Gallbladder is unremarkable.  CBD measures within normal limits at 3 mm.  Kidneys are normal in size and appearance measuring 12.4 cm on the right and 12.4 cm on the left.  Spleen is at the upper normal limits in size measuring 12.1 cm.  Visualized aspects of the pancreas, aorta, IVC and hepatic veins are unremarkable.  Impression: Evidence of hepatic steatosis.    Electronically signed by: Kirk Zurita MD  Date:    11/08/2022  Time:    12:18      I have reviewed prior labs, imaging, and notes.      Assessment:  1. Gastroesophageal reflux disease, unspecified whether esophagitis present    2. Chronic idiopathic constipation      Reflux accompanied by pyrosis, substernal and epigastric burning. Dysphagia recently began about six months ago.   Feels food sticking in back of throat. Liquid will clear. Currently taking Protonix 40mg BID for reflux. Has tried omeprazole in past as well.   Constipation ongoing for about 8 months. Not responding to stool softeners.     Plan:  Orders Placed This Encounter    esomeprazole (NEXIUM) 40 MG capsule    linaCLOtide (LINZESS) 145 mcg Cap capsule    Case Request Endoscopy: EGD (ESOPHAGOGASTRODUODENOSCOPY)     EGD  Class switch PPI Protonix to Nexium   Linzess 145 mcg daily    Consider MBSS pending EGD results and symptoms    Plan of care discussed with patient who is in agreement and verbalized understanding.     I have explained the planned procedures to the patient.The risks, benefits and alternatives of the procedure were also explained in detail. Patient verbalized understanding, all questions were answered. The patient agrees to proceed as planned    Follow Up:  ANTHONY Cameron, BIBIANA,FNP-BC  [facility]    (Portions of this note were dictated using voice recognition software and may contain dictation related errors in spelling/grammar/syntax not found on text review)

## 2023-07-27 NOTE — PATIENT INSTRUCTIONS
EGD Instructions    [facility] [address]  Clinic Office [phone]  Endoscopy Lab [phone]    You are scheduled for an EGD with Dr. Pederson  on  8/9/23 at [facility] in [place].    Check in at the Hospital -1st floor, Information desk.   Call [phone] to reschedule.    You cannot have anything to eat or drink after Midnight. You can brush your teeth with a sip of water.     An adult friend/family member must come with you to drive you home.  You cannot drive, take a taxi, Uber/Lyft or bus to leave the Endoscopy Center alone.  If you do not have someone to drive you home, your test will be cancelled.     Please follow the directions of your doctor if you take any pills that thin your blood. If you take these meds: Aggrenox, Brilinta, Effient, Eliquis, Lovenox, Plavix, Pletal, Pradaxa, Ticilid, Xarelto or Coumadin, let the doctor's office know.    DON'T: On the morning of the test do not take insulin or pills for diabetes.     DO: On the morning of the test, do take any pills for blood pressure, heart, anti-rejection and or seizures with a small sip of water. Bring any inhalers with you.    Leave all valuables and jewelry at home. You will be at the hospital for 2-4 hours.    Call the Endoscopy department at [phone] with any questions about your procedure.    Thank you for choosing [facility].

## 2023-07-27 NOTE — H&P (VIEW-ONLY)
GASTROENTEROLOGY CLINIC NOTE    Chief Complaint: The primary encounter diagnosis was Gastroesophageal reflux disease, unspecified whether esophagitis present. A diagnosis of Chronic idiopathic constipation was also pertinent to this visit.  Referring provider/PCP: Debi Campos MD    Charlene Mohan is a 27 y.o. female who is a new patient to me with a PMH that's significant for PCOS, nausea, and abdominal pain and is accompanied by her .  She is here today to establish care for reflux and constipation. Reflux has been ongoing for about 10 years with dysphagia occurring over the last six months. She has had EGD a few years ago; unsure of findings other than irritation in the stomach.   Currently taking Protonix 40mg BID.     GERD  When do symptoms occur: after eating and randomly throughout the day  Nausea/Vomiting: Nausea occurring daily and is worse in am and after eating.   Abdominal Pain: epigastric  Nocturnal symptoms: yes  Typical Symptoms    Pyrosis Yes   Accompanied by substernal burning and burning in back of throat   Regurgitation No regurgitation but does report nausea   Water Brash yes   Dysphagia/Odynophagia Feels food sticking in mouth and throat  Occurring about every other day  Drinking water helps move down  Occurring with drier foods and liquids  Coughing with eating  No odynophagia     Atypical Symptoms    Hoarseness/Cough no   Throat Clearing no   Chest Pain Substernal    Asthma yes     Treatments: Protonix (currently taking), omeprazole; Promethazine and zofran for nausea  Triggers: no specific triggers identified  Caffeine: 1 cup coffee daily  ETOH: no  NSAIDs: no    Constipation  How Long: About 8 months   Frequency of Bowel Movements: Appx once a week   Straining: Yes  Stool is hard in consistency   Complete Evacuation:    Hematochezia: no   Abdominal Pain: no   Unexplained Weight Loss/Change in Bowel Habits: no   Water Intake: Tries to get in 8 cups   Daily Fiber Supplement: no      Treatments: stool softeners    Anticoagulation or Antiplatelet: No    History of H.pylori: no  H.pylori Treatment:  Prior Upper Endoscopy: several years ago; irritation in stomach  Prior Colonoscopy: no  Family h/o Colon Cancer: paternal gm  Family h/o Crohn's Disease or Ulcerative Colitis: No  Family h/o Celiac Sprue: No  Abdominal Surgeries: no    Review of Systems   Constitutional:  Negative for weight loss.   HENT:  Negative for sore throat.    Eyes:  Negative for blurred vision.   Respiratory:  Negative for cough.    Cardiovascular:  Negative for chest pain.   Gastrointestinal:  Positive for abdominal pain, constipation, heartburn and nausea. Negative for blood in stool, diarrhea, melena and vomiting.   Genitourinary:  Negative for dysuria.   Musculoskeletal:  Negative for myalgias.   Skin:  Negative for rash.   Neurological:  Negative for headaches.   Endo/Heme/Allergies:  Negative for environmental allergies.   Psychiatric/Behavioral:  Negative for suicidal ideas. The patient is not nervous/anxious.        Past Medical History: has a past medical history of Dyslipidemia, Dysmetabolic syndrome X, Essential hypertension, benign, History of polycystic ovarian syndrome, Hypothyroidism, and Obesity.    Past Surgical History: has a past surgical history that includes Tonsillectomy; Adenoidectomy; [place] tooth extraction; and Spinal cord stimulator implant.    Family History:family history includes Colon cancer in her paternal grandmother; Diabetes in her maternal grandmother, paternal grandfather, and paternal grandmother; Hyperlipidemia in her father; Hypertension in her father, paternal grandfather, and paternal grandmother; Thyroid disease in her mother.    Allergies:   Review of patient's allergies indicates:   Allergen Reactions    Diclofenac     Levalbuterol hcl      Other reaction(s): Headache       Social History: reports that she has never smoked. She has never used smokeless tobacco. She reports that  she does not drink alcohol and does not use drugs.    Home medications:   Current Outpatient Medications on File Prior to Visit   Medication Sig Dispense Refill    albuterol (PROVENTIL/VENTOLIN HFA) 90 mcg/actuation inhaler INHALE 2 PUFFS BY MOUTH EVERY 4 TO 6 HOURS AS NEEDED FOR SHORTNESS OF BREATH OR WHEEZING      atorvastatin (LIPITOR) 10 MG tablet Take 10 mg by mouth once daily.      BREO ELLIPTA 100-25 mcg/dose diskus inhaler Inhale 1 puff into the lungs once daily.      buPROPion (WELLBUTRIN XL) 300 MG 24 hr tablet Take 1 tablet by mouth once daily.      busPIRone (BUSPAR) 7.5 MG tablet 15 mg 3 (three) times daily.      gabapentin (NEURONTIN) 300 MG capsule Take 600 mg by mouth 3 (three) times daily.      HYDROcodone-acetaminophen (NORCO) 5-325 mg per tablet Take 1 tablet by mouth every 8 (eight) hours as needed for Pain. (Patient taking differently: Take 7.5-325 tablets by mouth once daily.) 15 tablet 0    lisinopriL 10 MG tablet Take 10 mg by mouth once daily.      metFORMIN (GLUCOPHAGE-XR) 500 MG ER 24hr tablet TAKE 1 TABLET(500 MG) BY MOUTH TWICE DAILY WITH MEALS 60 tablet 11    metoprolol succinate (TOPROL-XL) 50 MG 24 hr tablet Take 50 mg by mouth once daily.      montelukast (SINGULAIR) 10 mg tablet Take 10 mg by mouth once daily.      mupirocin (BACTROBAN) 2 % ointment APPLY TOPICALLY TO THE AFFECTED AREA TWICE DAILY MIX W/ NYSTATIN AND APPLY TO NAIL FOLDS APPLY TO BIOPSY SITE      omalizumab (XOLAIR) 150 mg/mL injection       ondansetron (ZOFRAN) 8 MG tablet Take 8 mg by mouth 2 (two) times daily as needed.      pregabalin (LYRICA) 150 MG capsule Take 150 mg by mouth 3 (three) times daily.      spironolactone (ALDACTONE) 100 MG tablet TAKE 1 TABLET(100 MG) BY MOUTH TWICE DAILY 90 tablet 3    tiZANidine (ZANAFLEX) 4 MG tablet       traZODone (DESYREL) 150 MG tablet Take 300 mg by mouth once daily.      venlafaxine (EFFEXOR-XR) 150 MG Cp24 Take 2 capsules by mouth once daily.      CELEBREX 200 mg  "capsule Take 200 mg by mouth 2 (two) times daily.      hydrocortisone 2.5 % cream APPLY TOPICALLY TO THE AFFECTED AREA EVERY DAY      nystatin (MYCOSTATIN) cream APPLY TOPICALLY TO THE AFFECTED AREA DAILY MIX W/ MUPIROCIN AND APPLY TO NAIL FOLDS      sumatriptan (IMITREX) 100 MG tablet Take 1 tablet (100 mg total) by mouth daily as needed for Migraine. 9 tablet 0     No current facility-administered medications on file prior to visit.       Vital signs:  Ht 5' 5" (1.651 m)   Wt 114.3 kg (251 lb 15.8 oz)   BMI 41.93 kg/m²     Physical Exam  Vitals reviewed.   Constitutional:       Appearance: Normal appearance.   HENT:      Head: Normocephalic.   Pulmonary:      Effort: Pulmonary effort is normal. No respiratory distress.   Abdominal:      General: There is no distension.      Palpations: Abdomen is soft.      Tenderness: There is no abdominal tenderness.   Skin:     General: Skin is warm.   Neurological:      Mental Status: She is alert and oriented to person, place, and time.   Psychiatric:         Behavior: Behavior normal.         Thought Content: Thought content normal.         Routine labs:  Lab Results   Component Value Date    WBC 9.50 11/08/2022    HGB 12.1 11/08/2022    HCT 35.0 (L) 11/08/2022    MCV 87 11/08/2022     11/08/2022     Lab Results   Component Value Date    INR 1.0 10/04/2022     Lab Results   Component Value Date    IRON 62 10/04/2022    FERRITIN 62.7 10/04/2022    TIBC 309 10/04/2022     Lab Results   Component Value Date     11/08/2022    K 4.3 11/08/2022     11/08/2022    CO2 24 11/08/2022    BUN 5 (L) 11/08/2022    CREATININE 0.49 (L) 11/08/2022     Lab Results   Component Value Date    ALBUMIN 4.2 11/08/2022    ALT 24 11/08/2022    AST 31 11/08/2022    ALKPHOS 77 11/08/2022    BILITOT 0.2 11/08/2022     No results found for: "GLUCOSE"  Lab Results   Component Value Date    TSH 2.045 08/26/2022     Lab Results   Component Value Date    CALCIUM 9.0 11/08/2022    PHOS " 2.8 04/21/2011       Imaging:  US Abdomen Complete  Narrative: EXAMINATION:  US ABDOMEN COMPLETE    CLINICAL HISTORY:  Bruising    COMPARISON:  None    FINDINGS:  The liver exhibits a heterogeneous echotexture with increased echogenicity, suggesting fatty change.  Portal vein is patent with hepatopetal flow.  Gallbladder is unremarkable.  CBD measures within normal limits at 3 mm.  Kidneys are normal in size and appearance measuring 12.4 cm on the right and 12.4 cm on the left.  Spleen is at the upper normal limits in size measuring 12.1 cm.  Visualized aspects of the pancreas, aorta, IVC and hepatic veins are unremarkable.  Impression: Evidence of hepatic steatosis.    Electronically signed by: Kirk Zurita MD  Date:    11/08/2022  Time:    12:18      I have reviewed prior labs, imaging, and notes.      Assessment:  1. Gastroesophageal reflux disease, unspecified whether esophagitis present    2. Chronic idiopathic constipation      Reflux accompanied by pyrosis, substernal and epigastric burning. Dysphagia recently began about six months ago.   Feels food sticking in back of throat. Liquid will clear. Currently taking Protonix 40mg BID for reflux. Has tried omeprazole in past as well.   Constipation ongoing for about 8 months. Not responding to stool softeners.     Plan:  Orders Placed This Encounter    esomeprazole (NEXIUM) 40 MG capsule    linaCLOtide (LINZESS) 145 mcg Cap capsule    Case Request Endoscopy: EGD (ESOPHAGOGASTRODUODENOSCOPY)     EGD  Class switch PPI Protonix to Nexium   Linzess 145 mcg daily    Consider MBSS pending EGD results and symptoms    Plan of care discussed with patient who is in agreement and verbalized understanding.     I have explained the planned procedures to the patient.The risks, benefits and alternatives of the procedure were also explained in detail. Patient verbalized understanding, all questions were answered. The patient agrees to proceed as planned    Follow Up:  ANTHONY Cameron, BIBIANA,FNP-BC  [facility] [place]    (Portions of this note were dictated using voice recognition software and may contain dictation related errors in spelling/grammar/syntax not found on text review)

## 2023-08-07 ENCOUNTER — TELEPHONE (OUTPATIENT)
Dept: ENDOSCOPY | Facility: HOSPITAL | Age: 28
End: 2023-08-07
Payer: COMMERCIAL

## 2023-08-07 NOTE — TELEPHONE ENCOUNTER
Left messages instructing patient to call dept @ [phone] between 8am-3pm.    Arrival time to be given @ 1200  EGD  (Message sent via [facility] portal)

## 2023-08-07 NOTE — TELEPHONE ENCOUNTER
Spoke with patient about arrival time @ *1200   EGD    NPO status reviewed: Patient must have nothing to eat after midnight.  Pt may have CLEAR liquids ONLY until completely NPO 3 hrs @ 0900.    Medications: Do not take Insulin or oral diabetic medications the day of the procedure.  Take as prescribed: heart, seizure and blood pressure medication in the morning with a sip of water (less than an ounce).  Take any breathing medications and bring inhalers to hospital with you Leave all valuables and jewelry at home.     Wear comfortable clothes to procedure to change into hospital gown You cannot drive for 24 hours after your procedure because you will receive sedation for your procedure to make you comfortable.  A ride must be provided at discharge.

## 2023-08-09 ENCOUNTER — ANESTHESIA EVENT (OUTPATIENT)
Dept: ENDOSCOPY | Facility: HOSPITAL | Age: 28
End: 2023-08-09
Payer: COMMERCIAL

## 2023-08-09 ENCOUNTER — HOSPITAL ENCOUNTER (OUTPATIENT)
Facility: HOSPITAL | Age: 28
Discharge: HOME | End: 2023-08-09
Attending: INTERNAL MEDICINE | Admitting: INTERNAL MEDICINE
Payer: COMMERCIAL

## 2023-08-09 ENCOUNTER — ANESTHESIA (OUTPATIENT)
Dept: ENDOSCOPY | Facility: HOSPITAL | Age: 28
End: 2023-08-09
Payer: COMMERCIAL

## 2023-08-09 VITALS
HEART RATE: 83 BPM | OXYGEN SATURATION: 97 % | RESPIRATION RATE: 15 BRPM | SYSTOLIC BLOOD PRESSURE: 101 MMHG | WEIGHT: 246 LBS | HEIGHT: 65 IN | BODY MASS INDEX: 40.98 KG/M2 | DIASTOLIC BLOOD PRESSURE: 69 MMHG | TEMPERATURE: 97 F

## 2023-08-09 DIAGNOSIS — K21.9 ACID REFLUX: ICD-10-CM

## 2023-08-09 LAB
B-HCG UR QL: NEGATIVE
CTP QC/QA: YES
POCT GLUCOSE: 89 MG/DL (ref 70–110)

## 2023-08-09 PROCEDURE — C1769 GUIDE WIRE: HCPCS | Performed by: INTERNAL MEDICINE

## 2023-08-09 PROCEDURE — D9220A PRA ANESTHESIA: ICD-10-PCS | Mod: ANES,,, | Performed by: STUDENT IN AN ORGANIZED HEALTH CARE EDUCATION/TRAINING PROGRAM

## 2023-08-09 PROCEDURE — D9220A PRA ANESTHESIA: Mod: ANES,,, | Performed by: STUDENT IN AN ORGANIZED HEALTH CARE EDUCATION/TRAINING PROGRAM

## 2023-08-09 PROCEDURE — 88305 TISSUE EXAM BY PATHOLOGIST: CPT | Mod: 26,,, | Performed by: PATHOLOGY

## 2023-08-09 PROCEDURE — D9220A PRA ANESTHESIA: Mod: CRNA,,, | Performed by: NURSE ANESTHETIST, CERTIFIED REGISTERED

## 2023-08-09 PROCEDURE — 27201012 HC FORCEPS, HOT/COLD, DISP: Performed by: INTERNAL MEDICINE

## 2023-08-09 PROCEDURE — 25000003 PHARM REV CODE 250: Performed by: NURSE ANESTHETIST, CERTIFIED REGISTERED

## 2023-08-09 PROCEDURE — 88342 CHG IMMUNOCYTOCHEMISTRY: ICD-10-PCS | Mod: 26,,, | Performed by: PATHOLOGY

## 2023-08-09 PROCEDURE — 37000008 HC ANESTHESIA 1ST 15 MINUTES: Performed by: INTERNAL MEDICINE

## 2023-08-09 PROCEDURE — 25000003 PHARM REV CODE 250: Performed by: INTERNAL MEDICINE

## 2023-08-09 PROCEDURE — 43248 EGD GUIDE WIRE INSERTION: CPT | Mod: ,,, | Performed by: INTERNAL MEDICINE

## 2023-08-09 PROCEDURE — 88305 TISSUE EXAM BY PATHOLOGIST: ICD-10-PCS | Mod: 26,,, | Performed by: PATHOLOGY

## 2023-08-09 PROCEDURE — 63600175 PHARM REV CODE 636 W HCPCS: Performed by: NURSE ANESTHETIST, CERTIFIED REGISTERED

## 2023-08-09 PROCEDURE — 88342 IMHCHEM/IMCYTCHM 1ST ANTB: CPT | Mod: 26,,, | Performed by: PATHOLOGY

## 2023-08-09 PROCEDURE — 88305 TISSUE EXAM BY PATHOLOGIST: CPT | Performed by: PATHOLOGY

## 2023-08-09 PROCEDURE — 43248 PR EGD, FLEX, W/DILATION OVER GUIDEWIRE: ICD-10-PCS | Mod: ,,, | Performed by: INTERNAL MEDICINE

## 2023-08-09 PROCEDURE — 88342 IMHCHEM/IMCYTCHM 1ST ANTB: CPT | Performed by: PATHOLOGY

## 2023-08-09 PROCEDURE — D9220A PRA ANESTHESIA: ICD-10-PCS | Mod: CRNA,,, | Performed by: NURSE ANESTHETIST, CERTIFIED REGISTERED

## 2023-08-09 PROCEDURE — 43248 EGD GUIDE WIRE INSERTION: CPT | Performed by: INTERNAL MEDICINE

## 2023-08-09 RX ORDER — PROPOFOL 10 MG/ML
VIAL (ML) INTRAVENOUS
Status: DISCONTINUED | OUTPATIENT
Start: 2023-08-09 | End: 2023-08-09

## 2023-08-09 RX ORDER — SODIUM CHLORIDE 9 MG/ML
INJECTION, SOLUTION INTRAVENOUS CONTINUOUS
Status: DISCONTINUED | OUTPATIENT
Start: 2023-08-09 | End: 2023-08-09 | Stop reason: HOSPADM

## 2023-08-09 RX ORDER — LIDOCAINE HYDROCHLORIDE 20 MG/ML
INJECTION INTRAVENOUS
Status: DISCONTINUED | OUTPATIENT
Start: 2023-08-09 | End: 2023-08-09

## 2023-08-09 RX ORDER — PROPOFOL 10 MG/ML
VIAL (ML) INTRAVENOUS CONTINUOUS PRN
Status: DISCONTINUED | OUTPATIENT
Start: 2023-08-09 | End: 2023-08-09

## 2023-08-09 RX ADMIN — PROPOFOL 150 MCG/KG/MIN: 10 INJECTION, EMULSION INTRAVENOUS at 12:08

## 2023-08-09 RX ADMIN — PROPOFOL 80 MG: 10 INJECTION, EMULSION INTRAVENOUS at 12:08

## 2023-08-09 RX ADMIN — LIDOCAINE HYDROCHLORIDE 75 MG: 20 INJECTION, SOLUTION INTRAVENOUS at 12:08

## 2023-08-09 RX ADMIN — SODIUM CHLORIDE: 9 INJECTION, SOLUTION INTRAVENOUS at 12:08

## 2023-08-09 RX ADMIN — PROPOFOL 20 MG: 10 INJECTION, EMULSION INTRAVENOUS at 12:08

## 2023-08-09 NOTE — [employer]ATION PATIENT INSTRUCTIONS
Discharge Summary/Instructions after an Endoscopic Procedure  Patient Name: Charlene Mohan  Patient MRN: [id number]  Patient YOB: 1995 Wednesday, August 9, 2023  Fadi Pederson MD  Dear patient,  As a result of recent federal legislation (The Federal Cures Act), you may   receive lab or pathology results from your procedure in your [facility]   account before your physician is able to contact you. Your physician or   their representative will relay the results to you with their   recommendations at their soonest availability.  Thank you,  Your health is very important to us during the Covid Crisis. Following your   procedure today, you will receive a daily text for 2 weeks asking about   signs or symptoms of Covid 19.  Please respond to this text when you   receive it so we can follow up and keep you as safe as possible.   RESTRICTIONS:  During your procedure today, you received medications for sedation.  These   medications may affect your judgment, balance and coordination.  Therefore,   for 24 hours, you have the following restrictions:   - DO NOT drive a car, operate machinery, make legal/financial decisions,   sign important papers or drink alcohol.    ACTIVITY:  Today: no heavy lifting, straining or running due to procedural   sedation/anesthesia.  The following day: return to full activity including work.  DIET:  Eat and drink normally unless instructed otherwise.     TREATMENT FOR COMMON SIDE EFFECTS:  - Mild abdominal pain, nausea, belching, bloating or excessive gas:  rest,   eat lightly and use a heating pad.  - Sore Throat: treat with throat lozenges and/or gargle with warm salt   water.  - Because air was used during the procedure, expelling large amounts of air   from your rectum or belching is normal.  - If a bowel prep was taken, you may not have a bowel movement for 1-3 days.    This is normal.  SYMPTOMS TO WATCH FOR AND REPORT TO YOUR PHYSICIAN:  1. Abdominal pain or bloating, other than  gas cramps.  2. Chest pain.  3. Back pain.  4. Signs of infection such as: chills or fever occurring within 24 hours   after the procedure.  5. Rectal bleeding, which would show as bright red, maroon, or black stools.   (A tablespoon of blood from the rectum is not serious, especially if   hemorrhoids are present.)  6. Vomiting.  7. Weakness or dizziness.  GO DIRECTLY TO THE NEAREST EMERGENCY ROOM IF YOU HAVE ANY OF THE FOLLOWING:      Difficulty breathing              Chills and/or fever over 101 F   Persistent vomiting and/or vomiting blood   Severe abdominal pain   Severe chest pain   Black, tarry stools   Bleeding- more than one tablespoon   Any other symptom or condition that you feel may need urgent attention  Your doctor recommends these additional instructions:  If any biopsies were taken, your doctors clinic will contact you in 1 to 2   weeks with any results.  - Discharge patient to home.   - Patient has a contact number available for emergencies.  The signs and   symptoms of potential delayed complications were discussed with the   patient.  Return to normal activities tomorrow.  Written discharge   instructions were provided to the patient.   - Resume previous diet.   - Continue present medications.   - Await pathology results.   - Next step would be MBBS / speech therapy eval for possible oropharyngeal   dysphagia.  For questions, problems or results please call your physician - Fadi Pederson MD.  EMERGENCY PHONE NUMBER: [phone],  LAB RESULTS: [phone]  IF A COMPLICATION OR EMERGENCY SITUATION ARISES AND YOU ARE UNABLE TO REACH   YOUR PHYSICIAN - GO DIRECTLY TO THE EMERGENCY ROOM.  Fadi Pederson MD  8/9/2023 1:06:44 PM  This report has been verified and signed electronically.  Dear patient,  As a result of recent federal legislation (The Federal Cures Act), you may   receive lab or pathology results from your procedure in your [facility]   account before your physician is able to  contact you. Your physician or   their representative will relay the results to you with their   recommendations at their soonest availability.  Thank you,  [employer]ATION

## 2023-08-09 NOTE — TRANSFER OF CARE
"Anesthesia Transfer of Care Note    Patient: Charlene Mohan    Procedure(s) Performed: Procedure(s) (LRB):  EGD (ESOPHAGOGASTRODUODENOSCOPY) (N/A)    Patient location: GI    Anesthesia Type: general    Transport from OR: Transported from OR on room air with adequate spontaneous ventilation    Post pain: adequate analgesia    Post assessment: no apparent anesthetic complications and tolerated procedure well    Post vital signs: stable    Level of consciousness: responds to stimulation and sedated    Nausea/Vomiting: no nausea/vomiting    Complications: none    Transfer of care protocol was followed      Last vitals:   Visit Vitals  /63   Pulse 96   Temp 36.4 °C (97.5 °F)   Resp 18   Ht 5' 5" (1.651 m)   Wt 111.6 kg (246 lb)   SpO2 97%   Breastfeeding No   BMI 40.94 kg/m²     "

## 2023-08-09 NOTE — ANESTHESIA PREPROCEDURE EVALUATION
08/09/2023  [facility]  Anesthesia Pre-Operative Evaluation         Patient Name: Charlene Mohan  YOB: 1995  MRN: [id number]    SUBJECTIVE:     Pre-operative evaluation for Procedure(s) (LRB):  EGD (ESOPHAGOGASTRODUODENOSCOPY) (N/A)     08/09/2023    Charlene Mohan is a 28 y.o. female w/ a significant PMHx of morbid obesity (BMI 42), HTN, HLD, anxiety, DM, and left sided paralysis per patient.  Reflux has been ongoing for about 10 years with dysphagia occurring over the last six months.  Patient now presents for the above procedure(s).    TTE:   1. Global left ventricular systolic function is normal. The left   ventricular ejection fraction is 60%.   2. Left ventricular diastolic function is normal.   3. Trace mitral regurgitation. Trace tricuspid regurgitation.   4. The pulmonary artery appears normal.     Prev airway: none documented.     LDA: none documented.     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     Current Outpatient Medications on File Prior to Encounter   Medication Sig Dispense Refill    albuterol (PROVENTIL/VENTOLIN HFA) 90 mcg/actuation inhaler INHALE 2 PUFFS BY MOUTH  EVERY 4 TO 6 HOURS AS NEEDED FOR SHORTNESS OF BREATH OR WHEEZING      atorvastatin (LIPITOR) 10 MG tablet Take 10 mg by mouth once daily.      BREO ELLIPTA 100-25 mcg/dose diskus inhaler Inhale 1 puff into the lungs once daily.      buPROPion (WELLBUTRIN XL) 300 MG 24 hr tablet Take 1 tablet by mouth once daily.      busPIRone (BUSPAR) 7.5 MG tablet 15 mg 3 (three) times daily.      CELEBREX 200 mg capsule Take 200 mg by mouth 2 (two) times daily.      esomeprazole (NEXIUM) 40 MG capsule Take 1 capsule (40 mg total) by mouth before breakfast. 30 capsule 11    gabapentin (NEURONTIN) 300 MG capsule Take 600 mg by mouth 3 (three) times daily.      HYDROcodone-acetaminophen (NORCO) 5-325 mg per tablet Take 1 tablet by mouth every 8 (eight) hours as needed for Pain. (Patient taking differently: Take 7.5-325 tablets by mouth once daily.) 15 tablet 0    linaCLOtide (LINZESS) 145 mcg Cap capsule Take 1 capsule (145 mcg total) by mouth before breakfast. 90 capsule 3    lisinopriL 10 MG tablet Take 10 mg by mouth once daily.      metFORMIN (GLUCOPHAGE-XR) 500 MG ER 24hr tablet TAKE 1 TABLET(500 MG) BY MOUTH TWICE DAILY WITH MEALS 60 tablet 11    metoprolol succinate (TOPROL-XL) 50 MG 24 hr tablet Take 50 mg by mouth once daily.      montelukast (SINGULAIR) 10 mg tablet Take 10 mg by mouth once daily.      mupirocin (BACTROBAN) 2 % ointment APPLY TOPICALLY TO THE AFFECTED AREA TWICE DAILY MIX W/ NYSTATIN AND APPLY TO NAIL FOLDS APPLY TO BIOPSY SITE      omalizumab (XOLAIR) 150 mg/mL injection       ondansetron (ZOFRAN) 8 MG tablet Take 8 mg by mouth 2 (two) times daily as needed.      pregabalin (LYRICA) 150 MG capsule Take 150 mg by mouth 3 (three) times daily.      spironolactone (ALDACTONE) 100 MG tablet TAKE 1 TABLET(100 MG) BY MOUTH TWICE DAILY 90 tablet 3    tiZANidine (ZANAFLEX) 4 MG tablet       traZODone (DESYREL) 150 MG tablet Take 300 mg by mouth once daily.      venlafaxine (EFFEXOR-XR) 150 MG  Cp24 Take 2 capsules by mouth once daily.         Past Surgical History:   Procedure Laterality Date    ADENOIDECTOMY      SPINAL CORD STIMULATOR IMPLANT      TONSILLECTOMY      WISDOM TOOTH EXTRACTION         OBJECTIVE:     Vital Signs Range (Last 24H):         Significant Labs:  Lab Results   Component Value Date    WBC 9.50 11/08/2022    HGB 12.1 11/08/2022    HCT 35.0 (L) 11/08/2022     11/08/2022    CHOL 206 (H) 02/11/2013    TRIG 276 (H) 02/11/2013    HDL 57 02/11/2013    ALT 24 11/08/2022    AST 31 11/08/2022     11/08/2022    K 4.3 11/08/2022     11/08/2022    CREATININE 0.49 (L) 11/08/2022    BUN 5 (L) 11/08/2022    CO2 24 11/08/2022    TSH 2.045 08/26/2022    INR 1.0 10/04/2022    HGBA1C 5.2 02/11/2013       Diagnostic Studies: No relevant studies.    EKG:   Results for orders placed or performed during the hospital encounter of 08/06/20   EKG 12-lead    Collection Time: 08/06/20 12:45 PM    Narrative    Test Reason : R06.02,    Vent. Rate : 090 BPM     Atrial Rate : 090 BPM     P-R Int : 128 ms          QRS Dur : 078 ms      QT Int : 352 ms       P-R-T Axes : 025 000 012 degrees     QTc Int : 430 ms    Normal sinus rhythm  Minimal voltage criteria for LVH, may be normal variant ( R in aVL )  Borderline Abnormal ECG  No previous ECGs available  Confirmed by Ashok Canas DO ([id number]) on 8/6/2020 8:29:25 PM    Referred By: JAD NAVARRO           Confirmed By:Ashok Canas DO       ASSESSMENT/PLAN:           Pre-op Assessment    I have reviewed the Patient Summary Reports.     I have reviewed the Nursing Notes. I have reviewed the NPO Status.   I have reviewed the Medications.     Review of Systems  Anesthesia Hx:  No problems with previous Anesthesia  History of prior surgery of interest to airway management or planning: Denies Family Hx of Anesthesia complications.   Denies Personal Hx of Anesthesia complications.   Hematology/Oncology:  Hematology Normal   Oncology Normal      EENT/Dental:EENT/Dental Normal   Cardiovascular:   Hypertension    Pulmonary:   Shortness of breath    Renal/:  Renal/ Normal     Hepatic/GI:   Liver Disease,    Musculoskeletal:  Musculoskeletal Normal    Neurological:  Neurology Normal    Endocrine:   Diabetes Hypothyroidism  Morbid Obesity / BMI > 40  Psych:   anxiety          Physical Exam  General: Well nourished, Cooperative, Alert and Oriented    Airway:  Mallampati: III   Mouth Opening: Normal  TM Distance: Normal  Tongue: Normal  Neck ROM: Normal ROM    Dental:  Intact        Anesthesia Plan  Type of Anesthesia, risks & benefits discussed:    Anesthesia Type: Gen Natural Airway  Intra-op Monitoring Plan: Standard ASA Monitors  Post Op Pain Control Plan: multimodal analgesia and IV/PO Opioids PRN  Induction:  IV  Informed Consent: Informed consent signed with the Patient and all parties understand the risks and agree with anesthesia plan.  All questions answered.   ASA Score: 3  Day of Surgery Review of History & Physical: H&P Update referred to the surgeon/provider.    Ready For Surgery From Anesthesia Perspective.     .

## 2023-08-09 NOTE — ANESTHESIA POSTPROCEDURE EVALUATION
Anesthesia Post Evaluation    Patient: Charlene Mohan    Procedure(s) Performed: Procedure(s) (LRB):  EGD (ESOPHAGOGASTRODUODENOSCOPY) (N/A)    Final Anesthesia Type: general      Patient location during evaluation: GI PACU  Patient participation: Yes- Able to Participate  Level of consciousness: awake and alert  Post-procedure vital signs: reviewed and stable  Pain management: adequate  Airway patency: patent    PONV status at discharge: No PONV  Anesthetic complications: no      Cardiovascular status: blood pressure returned to baseline  Respiratory status: unassisted  Hydration status: euvolemic            Vitals Value Taken Time   /69 08/09/23 1325   Temp 36.3 °C (97.3 °F) 08/09/23 1308   Pulse 83 08/09/23 1325   Resp 15 08/09/23 1325   SpO2 97 % 08/09/23 1325         Event Time   Out of Recovery 13:38:00         Pain/Triny Score: Triny Score: 10 (8/9/2023  1:25 PM)

## 2023-08-14 LAB
FINAL PATHOLOGIC DIAGNOSIS: NORMAL
GROSS: NORMAL
Lab: NORMAL
MICROSCOPIC EXAM: NORMAL

## 2023-09-05 NOTE — PROGRESS NOTES
Subjective:      Patient ID: Charlene Mohan is a 28 y.o. female.    Chief Complaint:  No chief complaint on file.    History of Present Illness  Charlene Mohan is here for follow up of Hashimoto's and PCOS.  Previously seen by Dr Elena.  Last seen 8/2022.  This is their first visit with me.      With regards to Hashimoto's:    FH of thyroid disease: mother (hyperthyroid- CHAN)  FH of thyroid cancer: denies  Denies personal history of CHAN, FNA or thyroid surgery. Previously on and off replacement.     Latest Reference Range & Units 07/27/22 09:54 08/26/22 09:15   Thyrotropin Receptor Ab 0.00 - 1.75 IU/L  <1.10   Thyroperoxidase Antibodies <6.0 IU/mL 19.7 (H)    Thyroid-Stim IG Quantitative <0.10 IU/L  <0.10   (H): Data is abnormally high    Lab Results   Component Value Date    TSH 2.045 08/26/2022    FREET4 1.13 11/22/2010 1/25/2023  TSH 2.08    Current Medication:  None     Biotin Use: Yes     Current Symptoms:   Reports unexplained weight loss (40lbs in ~1 year), fatigue (may also be from medication, per patient), hair loss, brittle nails, heat intolerance.  Denies constipation, diarrhea, cold intolerance, palpitations.     Denies plans for fertility at this time.   IUD in place.     With regards to PCOS:     8/3/2023  K 4.3  Ca 9.5  Vit D 40     Latest Reference Range & Units 08/09/11 11:23   Testosterone, Free pg/mL <0.2   Testosterone, Total 5 - 73 ng/dl 25      Latest Reference Range & Units 08/09/11 11:23   Glucose 70 - 110 mg/dl 86   Insulin 0.1 - 14.0 uU/mL 33 (H)   (H): Data is abnormally high     Latest Reference Range & Units 08/26/22 09:15   Gluc Fast 70 - 110 mg/dL 89   Gluc 1 HR mg/dL 144   Gluc 2 HR mg/dL 133     Menstrual History/Obstetric:     Menarche: 11 y.o.   Reports history of irregular menstrual cycles.   IUD in place - no menstrual cycles.   OCP use: in the past  Obstetric history: none.    Hyperandrogenism Symptoms:     Hirsutism: yes - upper lip, lower back   Cosmetic measures: none  "  Acne: denies   Scalp hair loss: denies  Weight: loss - see above     Change in ring or shoe size: Denies  Weight gain or difficulty losing weight:  weight loss   Change in stretch marks: denies   Polyuria, polydipsia, nocturia, unexplained weight loss or blurred vision:  polydipsia, weight loss, vision changes (over 1 year - seeing Ophthalmology)    Current Meds:  Spironolactone 100mg twice daily  - on this since ~ 12y.o.   Metformin 500mg twice daily     Reports SMBG checks 60-80s.    Social Hx:   Smoking: denies  EtOH: denies  Illicit Drug use: denies  Employment: disability     Family Hx:   Hx of PCOS: denies  Hx of DM: father, maternal and paternal grandparents     Review of Systems  As above    Objective:   Physical Exam  Vitals reviewed.   Neck:      Thyroid: No thyromegaly.   Cardiovascular:      Rate and Rhythm: Normal rate.      Comments: No edema present  Pulmonary:      Effort: Pulmonary effort is normal.   Abdominal:      Palpations: Abdomen is soft.         Visit Vitals  BP 98/68   Pulse 72   Ht 5' 5" (1.651 m)   Wt 112.1 kg (247 lb 3.9 oz)   SpO2 98%   BMI 41.14 kg/m²       Body mass index is 41.14 kg/m².    Lab Review:   Lab Results   Component Value Date    HGBA1C 5.2 02/11/2013    HGBA1C 5.2 01/28/2012    HGBA1C 5.1 04/21/2011       Lab Results   Component Value Date    CHOL 206 (H) 02/11/2013    HDL 57 02/11/2013    LDLCALC 94.0 02/11/2013    TRIG 276 (H) 02/11/2013    CHOLHDL 27.7 02/11/2013     Lab Results   Component Value Date     11/08/2022    K 4.3 11/08/2022     11/08/2022    CO2 24 11/08/2022    GLU 92 11/08/2022    BUN 5 (L) 11/08/2022    CREATININE 0.49 (L) 11/08/2022    CALCIUM 9.0 11/08/2022    PROT 7.2 11/08/2022    ALBUMIN 4.2 11/08/2022    BILITOT 0.2 11/08/2022    ALKPHOS 77 11/08/2022    AST 31 11/08/2022    ALT 24 11/08/2022    ANIONGAP 7 (L) 11/08/2022    ESTGFRAFRICA >60.0 07/27/2022    EGFRNONAA >60.0 07/27/2022    TSH 2.045 08/26/2022     Vit D, 25-Hydroxy   Date " Value Ref Range Status   07/27/2022 67 30 - 96 ng/mL Final     Comment:     Vitamin D deficiency.........<10 ng/mL                              Vitamin D insufficiency......10-29 ng/mL       Vitamin D sufficiency........> or equal to 30 ng/mL  Vitamin D toxicity............>100 ng/mL       Assessment and Plan     1. Hashimoto's thyroiditis  TSH    T4, Free    US Soft Tissue Head Neck Thyroid      2. PCOS (polycystic ovarian syndrome)  Hemoglobin A1C          Hashimoto's thyroiditis  -- +TPOab.  -- Last TFTs unremarkable - monitor annually /more often if planning for fertility.     PCOS (polycystic ovarian syndrome)  -- Reviewed that this is a diagnosis of exclusion BUT long duration and lack of serious progression is reassuring.    -- Alerted that PCOS is a risk for T2DM.    -- Lifestyle changes and weight loss strategies emphasized.  - Metformin 500mg twice daily.  -- Discussed treatment options for hirsutism.   - Spironolactone 100mg twice daily. Contraception is advised given risk for fetal malformation.       Follow up in about 6 months (around 3/12/2024).

## 2023-09-12 ENCOUNTER — OFFICE VISIT (OUTPATIENT)
Dept: ENDOCRINOLOGY | Facility: CLINIC | Age: 28
End: 2023-09-12
Payer: COMMERCIAL

## 2023-09-12 ENCOUNTER — TELEPHONE (OUTPATIENT)
Dept: ENDOCRINOLOGY | Facility: CLINIC | Age: 28
End: 2023-09-12

## 2023-09-12 VITALS
WEIGHT: 247.25 LBS | HEIGHT: 65 IN | OXYGEN SATURATION: 98 % | BODY MASS INDEX: 41.19 KG/M2 | DIASTOLIC BLOOD PRESSURE: 68 MMHG | HEART RATE: 72 BPM | SYSTOLIC BLOOD PRESSURE: 98 MMHG

## 2023-09-12 DIAGNOSIS — E06.3 HASHIMOTO'S THYROIDITIS: Primary | ICD-10-CM

## 2023-09-12 DIAGNOSIS — E28.2 PCOS (POLYCYSTIC OVARIAN SYNDROME): ICD-10-CM

## 2023-09-12 PROCEDURE — 1159F PR MEDICATION LIST DOCUMENTED IN MEDICAL RECORD: ICD-10-PCS | Mod: CPTII,S$GLB,, | Performed by: NURSE PRACTITIONER

## 2023-09-12 PROCEDURE — 1160F PR REVIEW ALL MEDS BY PRESCRIBER/CLIN PHARMACIST DOCUMENTED: ICD-10-PCS | Mod: CPTII,S$GLB,, | Performed by: NURSE PRACTITIONER

## 2023-09-12 PROCEDURE — 99999 PR PBB SHADOW E&M-EST. PATIENT-LVL V: CPT | Mod: PBBFAC,,, | Performed by: NURSE PRACTITIONER

## 2023-09-12 PROCEDURE — 99999 PR PBB SHADOW E&M-EST. PATIENT-LVL V: ICD-10-PCS | Mod: PBBFAC,,, | Performed by: NURSE PRACTITIONER

## 2023-09-12 PROCEDURE — 3074F SYST BP LT 130 MM HG: CPT | Mod: CPTII,S$GLB,, | Performed by: NURSE PRACTITIONER

## 2023-09-12 PROCEDURE — 3008F PR BODY MASS INDEX (BMI) DOCUMENTED: ICD-10-PCS | Mod: CPTII,S$GLB,, | Performed by: NURSE PRACTITIONER

## 2023-09-12 PROCEDURE — 4010F ACE/ARB THERAPY RXD/TAKEN: CPT | Mod: CPTII,S$GLB,, | Performed by: NURSE PRACTITIONER

## 2023-09-12 PROCEDURE — 99214 PR OFFICE/OUTPT VISIT, EST, LEVL IV, 30-39 MIN: ICD-10-PCS | Mod: S$GLB,,, | Performed by: NURSE PRACTITIONER

## 2023-09-12 PROCEDURE — 4010F PR ACE/ARB THEARPY RXD/TAKEN: ICD-10-PCS | Mod: CPTII,S$GLB,, | Performed by: NURSE PRACTITIONER

## 2023-09-12 PROCEDURE — 1159F MED LIST DOCD IN RCRD: CPT | Mod: CPTII,S$GLB,, | Performed by: NURSE PRACTITIONER

## 2023-09-12 PROCEDURE — 99214 OFFICE O/P EST MOD 30 MIN: CPT | Mod: S$GLB,,, | Performed by: NURSE PRACTITIONER

## 2023-09-12 PROCEDURE — 3074F PR MOST RECENT SYSTOLIC BLOOD PRESSURE < 130 MM HG: ICD-10-PCS | Mod: CPTII,S$GLB,, | Performed by: NURSE PRACTITIONER

## 2023-09-12 PROCEDURE — 1160F RVW MEDS BY RX/DR IN RCRD: CPT | Mod: CPTII,S$GLB,, | Performed by: NURSE PRACTITIONER

## 2023-09-12 PROCEDURE — 3078F PR MOST RECENT DIASTOLIC BLOOD PRESSURE < 80 MM HG: ICD-10-PCS | Mod: CPTII,S$GLB,, | Performed by: NURSE PRACTITIONER

## 2023-09-12 PROCEDURE — 3008F BODY MASS INDEX DOCD: CPT | Mod: CPTII,S$GLB,, | Performed by: NURSE PRACTITIONER

## 2023-09-12 PROCEDURE — 3078F DIAST BP <80 MM HG: CPT | Mod: CPTII,S$GLB,, | Performed by: NURSE PRACTITIONER

## 2023-09-12 RX ORDER — HYDROXYZINE HYDROCHLORIDE 25 MG/1
25 TABLET, FILM COATED ORAL EVERY 6 HOURS PRN
COMMUNITY
Start: 2023-08-22

## 2023-09-12 RX ORDER — ZINC GLUCONATE 50 MG
50 TABLET ORAL DAILY
COMMUNITY

## 2023-09-12 RX ORDER — ATOGEPANT 60 MG/1
1 TABLET ORAL
COMMUNITY
Start: 2023-08-21

## 2023-09-12 RX ORDER — INCONTINENCE PAD,LINER,DISP
EACH MISCELLANEOUS
COMMUNITY

## 2023-09-12 RX ORDER — LEVOMEFOLATE CALCIUM 15 MG
TABLET ORAL
COMMUNITY

## 2023-09-12 RX ORDER — RIMEGEPANT SULFATE 75 MG/75MG
75 TABLET, ORALLY DISINTEGRATING ORAL EVERY OTHER DAY
COMMUNITY
Start: 2023-08-21

## 2023-09-12 RX ORDER — EPINEPHRINE 0.3 MG/.3ML
INJECTION SUBCUTANEOUS
COMMUNITY
Start: 2023-04-24

## 2023-09-12 RX ORDER — ACETAMINOPHEN AND PHENYLEPHRINE HCL 325; 5 MG/1; MG/1
TABLET ORAL
COMMUNITY

## 2023-09-12 RX ORDER — GLUCOSAM/CHONDRO/HERB 149/HYAL 750-100 MG
1000 TABLET ORAL 2 TIMES DAILY
COMMUNITY

## 2023-09-12 RX ORDER — OXCARBAZEPINE 300 MG/1
300 TABLET, FILM COATED ORAL 2 TIMES DAILY
COMMUNITY
Start: 2023-06-16

## 2023-09-12 RX ORDER — MAGNESIUM 200 MG
TABLET ORAL
COMMUNITY

## 2023-09-12 RX ORDER — VALACYCLOVIR HYDROCHLORIDE 1 G/1
1000 TABLET, FILM COATED ORAL
COMMUNITY
Start: 2023-07-19

## 2023-09-12 RX ORDER — NAPROXEN SODIUM 220 MG/1
81 TABLET, FILM COATED ORAL DAILY
COMMUNITY

## 2023-09-12 RX ORDER — MULTIVITAMIN/IRON/FOLIC ACID 18MG-0.4MG
TABLET ORAL
COMMUNITY

## 2023-09-12 NOTE — ASSESSMENT & PLAN NOTE
-- Reviewed that this is a diagnosis of exclusion BUT long duration and lack of serious progression is reassuring.    -- Alerted that PCOS is a risk for T2DM.    -- Lifestyle changes and weight loss strategies emphasized.  - Metformin 500mg twice daily.  -- Discussed treatment options for hirsutism.   - Spironolactone 100mg twice daily. Contraception is advised given risk for fetal malformation.

## 2023-09-14 ENCOUNTER — PATIENT MESSAGE (OUTPATIENT)
Dept: ENDOCRINOLOGY | Facility: CLINIC | Age: 28
End: 2023-09-14
Payer: COMMERCIAL

## 2023-09-14 DIAGNOSIS — E06.3 HYPOTHYROIDISM DUE TO HASHIMOTO'S THYROIDITIS: ICD-10-CM

## 2023-09-14 DIAGNOSIS — E28.2 PCOS (POLYCYSTIC OVARIAN SYNDROME): ICD-10-CM

## 2023-09-14 DIAGNOSIS — E03.8 HYPOTHYROIDISM DUE TO HASHIMOTO'S THYROIDITIS: ICD-10-CM

## 2023-09-14 RX ORDER — METFORMIN HYDROCHLORIDE 500 MG/1
500 TABLET, EXTENDED RELEASE ORAL 2 TIMES DAILY WITH MEALS
Qty: 180 TABLET | Refills: 3 | Status: SHIPPED | OUTPATIENT
Start: 2023-09-14

## 2023-09-14 RX ORDER — SPIRONOLACTONE 100 MG/1
100 TABLET, FILM COATED ORAL 2 TIMES DAILY
Qty: 90 TABLET | Refills: 3 | Status: SHIPPED | OUTPATIENT
Start: 2023-09-14

## 2023-09-20 ENCOUNTER — OFFICE VISIT (OUTPATIENT)
Dept: GASTROENTEROLOGY | Facility: CLINIC | Age: 28
End: 2023-09-20
Payer: COMMERCIAL

## 2023-09-20 ENCOUNTER — PATIENT MESSAGE (OUTPATIENT)
Dept: ENDOCRINOLOGY | Facility: CLINIC | Age: 28
End: 2023-09-20
Payer: COMMERCIAL

## 2023-09-20 VITALS — BODY MASS INDEX: 41.8 KG/M2 | HEIGHT: 65 IN | WEIGHT: 250.88 LBS

## 2023-09-20 DIAGNOSIS — K21.9 GASTROESOPHAGEAL REFLUX DISEASE, UNSPECIFIED WHETHER ESOPHAGITIS PRESENT: ICD-10-CM

## 2023-09-20 DIAGNOSIS — K59.04 CHRONIC IDIOPATHIC CONSTIPATION: Primary | ICD-10-CM

## 2023-09-20 DIAGNOSIS — R13.10 DYSPHAGIA, UNSPECIFIED TYPE: ICD-10-CM

## 2023-09-20 PROCEDURE — 3044F PR MOST RECENT HEMOGLOBIN A1C LEVEL <7.0%: ICD-10-PCS | Mod: CPTII,S$GLB,, | Performed by: NURSE PRACTITIONER

## 2023-09-20 PROCEDURE — 3008F BODY MASS INDEX DOCD: CPT | Mod: CPTII,S$GLB,, | Performed by: NURSE PRACTITIONER

## 2023-09-20 PROCEDURE — 3008F PR BODY MASS INDEX (BMI) DOCUMENTED: ICD-10-PCS | Mod: CPTII,S$GLB,, | Performed by: NURSE PRACTITIONER

## 2023-09-20 PROCEDURE — 4010F PR ACE/ARB THEARPY RXD/TAKEN: ICD-10-PCS | Mod: CPTII,S$GLB,, | Performed by: NURSE PRACTITIONER

## 2023-09-20 PROCEDURE — 99999 PR PBB SHADOW E&M-EST. PATIENT-LVL V: CPT | Mod: PBBFAC,,, | Performed by: NURSE PRACTITIONER

## 2023-09-20 PROCEDURE — 99214 PR OFFICE/OUTPT VISIT, EST, LEVL IV, 30-39 MIN: ICD-10-PCS | Mod: S$GLB,,, | Performed by: NURSE PRACTITIONER

## 2023-09-20 PROCEDURE — 4010F ACE/ARB THERAPY RXD/TAKEN: CPT | Mod: CPTII,S$GLB,, | Performed by: NURSE PRACTITIONER

## 2023-09-20 PROCEDURE — 3044F HG A1C LEVEL LT 7.0%: CPT | Mod: CPTII,S$GLB,, | Performed by: NURSE PRACTITIONER

## 2023-09-20 PROCEDURE — 99214 OFFICE O/P EST MOD 30 MIN: CPT | Mod: S$GLB,,, | Performed by: NURSE PRACTITIONER

## 2023-09-20 PROCEDURE — 99999 PR PBB SHADOW E&M-EST. PATIENT-LVL V: ICD-10-PCS | Mod: PBBFAC,,, | Performed by: NURSE PRACTITIONER

## 2023-09-20 RX ORDER — LANSOPRAZOLE 30 MG/1
30 CAPSULE, DELAYED RELEASE ORAL DAILY
Qty: 30 CAPSULE | Refills: 11 | Status: SHIPPED | OUTPATIENT
Start: 2023-09-20 | End: 2024-02-02 | Stop reason: SDUPTHER

## 2023-09-20 RX ORDER — LUBIPROSTONE 24 UG/1
24 CAPSULE ORAL 2 TIMES DAILY
Qty: 60 CAPSULE | Refills: 11 | Status: SHIPPED | OUTPATIENT
Start: 2023-09-20 | End: 2024-02-02 | Stop reason: SDUPTHER

## 2023-09-20 NOTE — TELEPHONE ENCOUNTER
Component      Latest Ref Rng 9/19/2023   Hemoglobin A1C External      4.0 - 5.6 % 4.9    Estimated Avg Glucose      68 - 131 mg/dL 94    TSH      0.400 - 4.000 uIU/mL 0.571    Free T4      0.71 - 1.51 ng/dL 1.02

## 2023-09-20 NOTE — PROGRESS NOTES
GASTROENTEROLOGY CLINIC NOTE    Chief Complaint: The primary encounter diagnosis was Chronic idiopathic constipation. Diagnoses of Gastroesophageal reflux disease, unspecified whether esophagitis present and Dysphagia, unspecified type were also pertinent to this visit.  Referring provider/PCP: Debi Campos MD Maryssreese Mohan is a 28 y.o. female who is a new patient to me with a PMH that's significant for PCOS, nausea, and abdominal pain and is accompanied by her .  She is here today to establish care for reflux and constipation. Reflux has been ongoing for about 10 years with dysphagia occurring over the last six months. She has had EGD a few years ago; unsure of findings other than irritation in the stomach.   Currently taking Protonix 40mg BID.     GERD  When do symptoms occur: after eating and randomly throughout the day  Nausea/Vomiting: Nausea occurring daily and is worse in am and after eating.   Abdominal Pain: epigastric  Nocturnal symptoms: yes  Typical Symptoms    Pyrosis Yes   Accompanied by substernal burning and burning in back of throat   Regurgitation No regurgitation but does report nausea   Water Brash yes   Dysphagia/Odynophagia Feels food sticking in mouth and throat  Occurring about every other day  Drinking water helps move down  Occurring with drier foods and liquids  Coughing with eating  No odynophagia     Atypical Symptoms    Hoarseness/Cough no   Throat Clearing no   Chest Pain Substernal    Asthma yes     Treatments: Protonix (currently taking), omeprazole; Promethazine and zofran for nausea  Triggers: no specific triggers identified  Caffeine: 1 cup coffee daily  ETOH: no  NSAIDs: no    Constipation  How Long: About 8 months   Frequency of Bowel Movements: Appx once a week   Straining: Yes  Stool is hard in consistency   Complete Evacuation:    Hematochezia: no   Abdominal Pain: no   Unexplained Weight Loss/Change in Bowel Habits: no   Water Intake: Tries to get in 8  cups   Daily Fiber Supplement: no     Treatments: stool softeners    _____________________________________________________________________________________  Interval Note 9/20/2023  Charlene Mohan who is known to me presents for follow up regarding reflux and constipation. Following last office visit, she underwent EGD with dilation with Dr. Pederson. No obvious etiology for symptoms noted with EGD.   Protonix was also changed to Nexium and Linzess initiated for constipation..     No improvement with nausea and reflux, heartburn continues to occur with nexium. Feels symptoms worse with Nexium than Protonix. Nocturnal symptoms.   Continues to have dysphagia. Water clears food    Taking Linzess 145mcg  Will have days 2-3 days without bowel movement and days with several bowel movements per day between 6-8. Mushy and watery in consistency.     Anticoagulation or Antiplatelet: No  GLP-1: No    History of H.pylori: no  H.pylori Treatment:  Prior Upper Endoscopy: 8/2023 with Dr. Pederson  Impression:            - No endoscopic esophageal abnormality to explain                          patient's dysphagia. Esophagus dilated. Dilated.                          - Normal stomach. Biopsied.                          - Normal examined duodenum.     Recommendation:        - Discharge patient to home.                          - Patient has a contact number available for                          emergencies. The signs and symptoms of potential                          delayed complications were discussed with the                          patient. Return to normal activities tomorrow.                          Written discharge instructions were provided to                          the patient.                          - Resume previous diet.                          - Continue present medications.                          - Await pathology results.                          - Next step would be MBBS / speech therapy lanny                           for possible oropharyngeal dysphagia.     Pathology:  Stomach, random (biopsy):   Antral mucosa with marked reactive/regenerative changes and active inflammation   Considerations include erosive/chemical type gastropathy   Oxyntic mucosa with minimal chronic inflammation, non specific   No Helicobacter organisms (routine and immunostain)     Prior Colonoscopy: no  Family h/o Colon Cancer: paternal gm  Family h/o Crohn's Disease or Ulcerative Colitis: No  Family h/o Celiac Sprue: No  Abdominal Surgeries: no    Review of Systems   Constitutional:  Negative for weight loss.   HENT:  Negative for sore throat.    Eyes:  Negative for blurred vision.   Respiratory:  Negative for cough.    Cardiovascular:  Negative for chest pain.   Gastrointestinal:  Positive for abdominal pain, constipation, heartburn and nausea. Negative for blood in stool, diarrhea, melena and vomiting.   Genitourinary:  Negative for dysuria.   Musculoskeletal:  Negative for myalgias.   Skin:  Negative for rash.   Neurological:  Negative for headaches.   Endo/Heme/Allergies:  Negative for environmental allergies.   Psychiatric/Behavioral:  Negative for suicidal ideas. The patient is not nervous/anxious.        Past Medical History: has a past medical history of Dyslipidemia, Dysmetabolic syndrome X, Essential hypertension, benign, History of polycystic ovarian syndrome, Hypothyroidism, and Obesity.    Past Surgical History: has a past surgical history that includes Tonsillectomy; Adenoidectomy; Winn tooth extraction; Spinal cord stimulator implant; and Esophagogastroduodenoscopy (N/A, 8/9/2023).    Family History:family history includes Colon cancer in her paternal grandmother; Diabetes in her maternal grandmother, paternal grandfather, and paternal grandmother; Hyperlipidemia in her father; Hypertension in her father, paternal grandfather, and paternal grandmother; Thyroid disease in her mother.    Allergies:   Review of patient's allergies  indicates:   Allergen Reactions    Diclofenac     Levalbuterol hcl      Other reaction(s): Headache       Social History: reports that she has never smoked. She has never used smokeless tobacco. She reports that she does not drink alcohol and does not use drugs.    Home medications:   Current Outpatient Medications on File Prior to Visit   Medication Sig Dispense Refill    albuterol (PROVENTIL/VENTOLIN HFA) 90 mcg/actuation inhaler INHALE 2 PUFFS BY MOUTH EVERY 4 TO 6 HOURS AS NEEDED FOR SHORTNESS OF BREATH OR WHEEZING      aspirin 81 MG Chew Take 81 mg by mouth once daily.      atorvastatin (LIPITOR) 10 MG tablet Take 10 mg by mouth once daily.      biotin 10,000 mcg Cap Biotin      biotin-keratin (BIOTIN PLUS KERATIN) 10,000-100 mcg-mg Tab Keratin      BREO ELLIPTA 100-25 mcg/dose diskus inhaler Inhale 1 puff into the lungs once daily.      brivaracetam (BRIVIACT) 50 mg Tab Briviact      buPROPion (WELLBUTRIN XL) 300 MG 24 hr tablet Take 1 tablet by mouth once daily.      busPIRone (BUSPAR) 7.5 MG tablet 15 mg 3 (three) times daily.      CELEBREX 200 mg capsule Take 200 mg by mouth 2 (two) times daily.      EPINEPHrine (EPIPEN) 0.3 mg/0.3 mL AtIn as directed Injection prn anaphylaxis      gabapentin (NEURONTIN) 300 MG capsule Take 600 mg by mouth 3 (three) times daily.      HYDROcodone-acetaminophen (NORCO) 5-325 mg per tablet Take 1 tablet by mouth every 8 (eight) hours as needed for Pain. (Patient taking differently: Take 7.5-325 tablets by mouth once daily.) 15 tablet 0    hydrOXYzine HCL (ATARAX) 25 MG tablet Take 25 mg by mouth every 6 (six) hours as needed.      levomefolate calcium (L-METHYLFOLATE) 15 mg Tab L-Methylfolate      lisinopriL 10 MG tablet Take 10 mg by mouth once daily.      magnesium 200 mg Tab 400 mg daily      metFORMIN (GLUCOPHAGE-XR) 500 MG ER 24hr tablet Take 1 tablet (500 mg total) by mouth 2 (two) times daily with meals. 180 tablet 3    metoprolol succinate (TOPROL-XL) 50 MG 24 hr  "tablet Take 50 mg by mouth once daily.      montelukast (SINGULAIR) 10 mg tablet Take 10 mg by mouth once daily.      multivitamin-iron-folic acid (CENTRUM WOMEN) Tab Centrum Women      mupirocin (BACTROBAN) 2 % ointment APPLY TOPICALLY TO THE AFFECTED AREA TWICE DAILY MIX W/ NYSTATIN AND APPLY TO NAIL FOLDS APPLY TO BIOPSY SITE      NURTEC 75 mg odt Take 75 mg by mouth every other day.      omalizumab (XOLAIR) 150 mg/mL injection       omega 3-dha-epa-fish oil (FISH OIL) 1,000 mg (120 mg-180 mg) Cap Take 1,000 mg by mouth 2 (two) times a day.      ondansetron (ZOFRAN) 8 MG tablet Take 8 mg by mouth 2 (two) times daily as needed.      OXcarbazepine (TRILEPTAL) 300 MG Tab Take 300 mg by mouth 2 (two) times daily.      pregabalin (LYRICA) 150 MG capsule Take 150 mg by mouth 3 (three) times daily.      QULIPTA 60 mg Tab Take 1 tablet by mouth.      spironolactone (ALDACTONE) 100 MG tablet Take 1 tablet (100 mg total) by mouth 2 (two) times daily. 90 tablet 3    tiZANidine (ZANAFLEX) 4 MG tablet       traZODone (DESYREL) 300 MG tablet Take 300 mg by mouth once daily.      valACYclovir (VALTREX) 1000 MG tablet Take 1,000 mg by mouth.      venlafaxine (EFFEXOR-XR) 150 MG Cp24 Take 2 capsules by mouth once daily.      vitamin D3-folic acid 125 mcg (5,000 unit)-1 mg Tab Vitamin D      zinc gluconate 50 mg tablet Take 50 mg by mouth once daily.       No current facility-administered medications on file prior to visit.       Vital signs:  Ht 5' 5" (1.651 m)   Wt 113.8 kg (250 lb 14.1 oz)   BMI 41.75 kg/m²     Physical Exam  Vitals reviewed.   Constitutional:       Appearance: Normal appearance.   HENT:      Head: Normocephalic.   Pulmonary:      Effort: Pulmonary effort is normal. No respiratory distress.   Abdominal:      General: There is no distension.      Palpations: Abdomen is soft.      Tenderness: There is no abdominal tenderness.   Skin:     General: Skin is warm.   Neurological:      Mental Status: She is alert " "and oriented to person, place, and time.   Psychiatric:         Behavior: Behavior normal.         Thought Content: Thought content normal.         Routine labs:  Lab Results   Component Value Date    WBC 13.01 (H) 09/21/2023    HGB 13.0 09/21/2023    HCT 39.9 09/21/2023    MCV 93 09/21/2023     09/21/2023     Lab Results   Component Value Date    INR 1.0 10/04/2022     Lab Results   Component Value Date    IRON 62 10/04/2022    FERRITIN 62.7 10/04/2022    TIBC 309 10/04/2022     Lab Results   Component Value Date     09/21/2023    K 4.3 09/21/2023     09/21/2023    CO2 30 (H) 09/21/2023    BUN 7 09/21/2023    CREATININE 0.7 09/21/2023     Lab Results   Component Value Date    ALBUMIN 3.6 09/21/2023    ALT 13 09/21/2023    AST 14 09/21/2023    ALKPHOS 77 09/21/2023    BILITOT 0.2 09/21/2023     No results found for: "GLUCOSE"  Lab Results   Component Value Date    TSH 0.571 09/19/2023     Lab Results   Component Value Date    CALCIUM 9.6 09/21/2023    PHOS 2.8 04/21/2011       Imaging:  US Soft Tissue Head Neck Thyroid  Narrative: EXAMINATION:  US SOFT TISSUE HEAD NECK THYROID    CLINICAL HISTORY:  Autoimmune thyroiditis    TECHNIQUE:  Ultrasound of the thyroid and cervical lymph nodes was performed.    COMPARISON:  None.    FINDINGS:  The thyroid gland measures 4.8 x 1.4 x 1.6 cm on the right and 5 x 1.3 x 1.7 cm on the left.  The parenchyma is heterogeneous without distinct nodule.    There is no abnormal lymphadenopathy in the neck.  There is no increased vascularity within the gland.  Impression: Mildly heterogeneous thyroid gland which may reflect patient's thyroiditis.  No distinct thyroid nodule.    Electronically signed by: Peggy Conway MD  Date:    09/18/2023  Time:    11:05      I have reviewed prior labs, imaging, and notes.      Assessment:  1. Chronic idiopathic constipation    2. Gastroesophageal reflux disease, unspecified whether esophagitis present    3. Dysphagia, unspecified " type        Reflux accompanied by pyrosis, substernal and epigastric burning. Dysphagia recently began about six months ago.   Feels food sticking in back of throat. Liquid will clear. Currently taking Nexium. Has tried omeprazole and Protonix in past as well.   Constipation ongoing for about 8 months. Not responding to stool softeners or Linzess    Plan:  Orders Placed This Encounter    Fl Modified Barium Swallow Speech    SLP video swallow    lubiprostone (AMITIZA) 24 MCG Cap    lansoprazole (PREVACID) 30 MG capsule     MBSS  Prevacid  Amitiza    Plan of care discussed with patient who is in agreement and verbalized understanding.     I have explained the planned procedures to the patient.The risks, benefits and alternatives of the procedure were also explained in detail. Patient verbalized understanding, all questions were answered. The patient agrees to proceed as planned    Follow Up: ANTHONY Cameron, APRN,FNP-BC  [facility]    (Portions of this note were dictated using voice recognition software and may contain dictation related errors in spelling/grammar/syntax not found on text review)

## 2023-09-20 NOTE — PATIENT INSTRUCTIONS
Start Metamucil (psyllium husk) once a day. Mix 1 tablespoon in 7-8 ounces of water OR take 3 capsules once a day.     Stop Linzess and Nexium.   Start Amitiza twice a day and Prevacid once a day.   Swallow study.

## 2023-09-21 ENCOUNTER — LAB VISIT (OUTPATIENT)
Dept: LAB | Facility: HOSPITAL | Age: 28
End: 2023-09-21
Attending: INTERNAL MEDICINE
Payer: COMMERCIAL

## 2023-09-21 ENCOUNTER — OFFICE VISIT (OUTPATIENT)
Dept: HEMATOLOGY/ONCOLOGY | Facility: CLINIC | Age: 28
End: 2023-09-21
Payer: COMMERCIAL

## 2023-09-21 VITALS
BODY MASS INDEX: 41.43 KG/M2 | DIASTOLIC BLOOD PRESSURE: 71 MMHG | SYSTOLIC BLOOD PRESSURE: 111 MMHG | WEIGHT: 248.69 LBS | TEMPERATURE: 98 F | OXYGEN SATURATION: 99 % | HEART RATE: 71 BPM | HEIGHT: 65 IN

## 2023-09-21 DIAGNOSIS — T14.8XXA BRUISING: ICD-10-CM

## 2023-09-21 DIAGNOSIS — D72.820 LYMPHOCYTOSIS: ICD-10-CM

## 2023-09-21 DIAGNOSIS — D72.820 LYMPHOCYTOSIS: Primary | ICD-10-CM

## 2023-09-21 PROBLEM — Z71.89 ENCOUNTER TO DISCUSS TREATMENT OPTIONS: Status: RESOLVED | Noted: 2022-10-04 | Resolved: 2023-09-21

## 2023-09-21 PROBLEM — Z71.2 ENCOUNTER TO DISCUSS TEST RESULTS: Status: RESOLVED | Noted: 2022-10-04 | Resolved: 2023-09-21

## 2023-09-21 LAB
ALBUMIN SERPL BCP-MCNC: 3.6 G/DL (ref 3.5–5.2)
ALP SERPL-CCNC: 77 U/L (ref 55–135)
ALT SERPL W/O P-5'-P-CCNC: 13 U/L (ref 10–44)
ANION GAP SERPL CALC-SCNC: 6 MMOL/L (ref 8–16)
AST SERPL-CCNC: 14 U/L (ref 10–40)
BASOPHILS # BLD AUTO: 0.07 K/UL (ref 0–0.2)
BASOPHILS NFR BLD: 0.5 % (ref 0–1.9)
BILIRUB SERPL-MCNC: 0.2 MG/DL (ref 0.1–1)
BUN SERPL-MCNC: 7 MG/DL (ref 6–20)
CALCIUM SERPL-MCNC: 9.6 MG/DL (ref 8.7–10.5)
CHLORIDE SERPL-SCNC: 104 MMOL/L (ref 95–110)
CO2 SERPL-SCNC: 30 MMOL/L (ref 23–29)
CREAT SERPL-MCNC: 0.7 MG/DL (ref 0.5–1.4)
DIFFERENTIAL METHOD: ABNORMAL
EOSINOPHIL # BLD AUTO: 0.1 K/UL (ref 0–0.5)
EOSINOPHIL NFR BLD: 0.8 % (ref 0–8)
ERYTHROCYTE [DISTWIDTH] IN BLOOD BY AUTOMATED COUNT: 12 % (ref 11.5–14.5)
EST. GFR  (NO RACE VARIABLE): >60 ML/MIN/1.73 M^2
FLOW CYTOMETRY ANTIBODIES ANALYZED - BLOOD: NORMAL
FLOW CYTOMETRY COMMENT - BLOOD: NORMAL
FLOW CYTOMETRY INTERPRETATION - BLOOD: NORMAL
GLUCOSE SERPL-MCNC: 83 MG/DL (ref 70–110)
HCT VFR BLD AUTO: 39.9 % (ref 37–48.5)
HGB BLD-MCNC: 13 G/DL (ref 12–16)
IMM GRANULOCYTES # BLD AUTO: 0.19 K/UL (ref 0–0.04)
IMM GRANULOCYTES NFR BLD AUTO: 1.5 % (ref 0–0.5)
LDH SERPL L TO P-CCNC: 185 U/L (ref 110–260)
LYMPHOCYTES # BLD AUTO: 4.3 K/UL (ref 1–4.8)
LYMPHOCYTES NFR BLD: 32.7 % (ref 18–48)
MCH RBC QN AUTO: 30.4 PG (ref 27–31)
MCHC RBC AUTO-ENTMCNC: 32.6 G/DL (ref 32–36)
MCV RBC AUTO: 93 FL (ref 82–98)
MONOCYTES # BLD AUTO: 1 K/UL (ref 0.3–1)
MONOCYTES NFR BLD: 7.6 % (ref 4–15)
NEUTROPHILS # BLD AUTO: 7.4 K/UL (ref 1.8–7.7)
NEUTROPHILS NFR BLD: 56.9 % (ref 38–73)
NRBC BLD-RTO: 0 /100 WBC
PATH REV BLD -IMP: NORMAL
PATH REV BLD -IMP: NORMAL
PLATELET # BLD AUTO: 348 K/UL (ref 150–450)
PMV BLD AUTO: 8.9 FL (ref 9.2–12.9)
POTASSIUM SERPL-SCNC: 4.3 MMOL/L (ref 3.5–5.1)
PROT SERPL-MCNC: 6.9 G/DL (ref 6–8.4)
RBC # BLD AUTO: 4.27 M/UL (ref 4–5.4)
SODIUM SERPL-SCNC: 140 MMOL/L (ref 136–145)
WBC # BLD AUTO: 13.01 K/UL (ref 3.9–12.7)

## 2023-09-21 PROCEDURE — 99999 PR PBB SHADOW E&M-EST. PATIENT-LVL V: ICD-10-PCS | Mod: PBBFAC,,, | Performed by: INTERNAL MEDICINE

## 2023-09-21 PROCEDURE — 88189 PR  FLOWCYTOMETRY/READ, 16 & > MARKERS: ICD-10-PCS | Mod: ,,, | Performed by: PATHOLOGY

## 2023-09-21 PROCEDURE — 85060 BLOOD SMEAR INTERPRETATION: CPT | Mod: ,,, | Performed by: PATHOLOGY

## 2023-09-21 PROCEDURE — 3044F PR MOST RECENT HEMOGLOBIN A1C LEVEL <7.0%: ICD-10-PCS | Mod: CPTII,S$GLB,, | Performed by: INTERNAL MEDICINE

## 2023-09-21 PROCEDURE — 3078F PR MOST RECENT DIASTOLIC BLOOD PRESSURE < 80 MM HG: ICD-10-PCS | Mod: CPTII,S$GLB,, | Performed by: INTERNAL MEDICINE

## 2023-09-21 PROCEDURE — 1159F MED LIST DOCD IN RCRD: CPT | Mod: CPTII,S$GLB,, | Performed by: INTERNAL MEDICINE

## 2023-09-21 PROCEDURE — 3074F SYST BP LT 130 MM HG: CPT | Mod: CPTII,S$GLB,, | Performed by: INTERNAL MEDICINE

## 2023-09-21 PROCEDURE — 3044F HG A1C LEVEL LT 7.0%: CPT | Mod: CPTII,S$GLB,, | Performed by: INTERNAL MEDICINE

## 2023-09-21 PROCEDURE — 1160F RVW MEDS BY RX/DR IN RCRD: CPT | Mod: CPTII,S$GLB,, | Performed by: INTERNAL MEDICINE

## 2023-09-21 PROCEDURE — 88184 FLOWCYTOMETRY/ TC 1 MARKER: CPT | Performed by: PATHOLOGY

## 2023-09-21 PROCEDURE — 85025 COMPLETE CBC W/AUTO DIFF WBC: CPT | Performed by: INTERNAL MEDICINE

## 2023-09-21 PROCEDURE — 88189 FLOWCYTOMETRY/READ 16 & >: CPT | Mod: ,,, | Performed by: PATHOLOGY

## 2023-09-21 PROCEDURE — 88185 FLOWCYTOMETRY/TC ADD-ON: CPT | Performed by: PATHOLOGY

## 2023-09-21 PROCEDURE — 1159F PR MEDICATION LIST DOCUMENTED IN MEDICAL RECORD: ICD-10-PCS | Mod: CPTII,S$GLB,, | Performed by: INTERNAL MEDICINE

## 2023-09-21 PROCEDURE — 3074F PR MOST RECENT SYSTOLIC BLOOD PRESSURE < 130 MM HG: ICD-10-PCS | Mod: CPTII,S$GLB,, | Performed by: INTERNAL MEDICINE

## 2023-09-21 PROCEDURE — 99203 OFFICE O/P NEW LOW 30 MIN: CPT | Mod: S$GLB,,, | Performed by: INTERNAL MEDICINE

## 2023-09-21 PROCEDURE — 99999 PR PBB SHADOW E&M-EST. PATIENT-LVL V: CPT | Mod: PBBFAC,,, | Performed by: INTERNAL MEDICINE

## 2023-09-21 PROCEDURE — 80053 COMPREHEN METABOLIC PANEL: CPT | Performed by: INTERNAL MEDICINE

## 2023-09-21 PROCEDURE — 4010F PR ACE/ARB THEARPY RXD/TAKEN: ICD-10-PCS | Mod: CPTII,S$GLB,, | Performed by: INTERNAL MEDICINE

## 2023-09-21 PROCEDURE — 3078F DIAST BP <80 MM HG: CPT | Mod: CPTII,S$GLB,, | Performed by: INTERNAL MEDICINE

## 2023-09-21 PROCEDURE — 1160F PR REVIEW ALL MEDS BY PRESCRIBER/CLIN PHARMACIST DOCUMENTED: ICD-10-PCS | Mod: CPTII,S$GLB,, | Performed by: INTERNAL MEDICINE

## 2023-09-21 PROCEDURE — 3008F BODY MASS INDEX DOCD: CPT | Mod: CPTII,S$GLB,, | Performed by: INTERNAL MEDICINE

## 2023-09-21 PROCEDURE — 3008F PR BODY MASS INDEX (BMI) DOCUMENTED: ICD-10-PCS | Mod: CPTII,S$GLB,, | Performed by: INTERNAL MEDICINE

## 2023-09-21 PROCEDURE — 83615 LACTATE (LD) (LDH) ENZYME: CPT | Performed by: INTERNAL MEDICINE

## 2023-09-21 PROCEDURE — 85060 PATHOLOGIST REVIEW: ICD-10-PCS | Mod: ,,, | Performed by: PATHOLOGY

## 2023-09-21 PROCEDURE — 99203 PR OFFICE/OUTPT VISIT, NEW, LEVL III, 30-44 MIN: ICD-10-PCS | Mod: S$GLB,,, | Performed by: INTERNAL MEDICINE

## 2023-09-21 PROCEDURE — 4010F ACE/ARB THERAPY RXD/TAKEN: CPT | Mod: CPTII,S$GLB,, | Performed by: INTERNAL MEDICINE

## 2023-09-21 NOTE — PROGRESS NOTES
HEMATOLOGIC MALIGNANCIES CONSULT NOTE    IDENTIFYING STATEMENT   Charlene Mohan (Charlene) is a 28 y.o. female with a  of 1995 from [place], [place], referred by Dr. Campos for evaluation of bruising.     HISTORY OF PRESENT ILLNESS:      Ms. Mohan is 28, has HTN, dyslipidemia, PCOS, and is referred for evaluation of leukocytosis and report of easy bruising.    She has intermittent history of leukocytosis characterized either by transient neutrophilia or lymphocytosis. Of note, lymphocytosis has been defined in outside labs where upper limit of normal absolute lymphocyte count is 3.3.    She reports easy bruising with venipuncture. She takes daily celecoxib and low dose aspirin.      Past Medical History:   Diagnosis Date    Dyslipidemia     Dysmetabolic syndrome X     Essential hypertension, benign     History of polycystic ovarian syndrome     Hypothyroidism     Obesity        Family History   Problem Relation Age of Onset    Hypertension Paternal Grandfather     Diabetes Paternal Grandfather     Colon cancer Paternal Grandmother     Hypertension Paternal Grandmother     Diabetes Paternal Grandmother     Diabetes Maternal Grandmother     Hypertension Father     Hyperlipidemia Father     Thyroid disease Mother     Breast cancer Neg Hx     Ovarian cancer Neg Hx        Social History     Socioeconomic History    Marital status:    Tobacco Use    Smoking status: Never    Smokeless tobacco: Never   Substance and Sexual Activity    Alcohol use: No    Drug use: No    Sexual activity: Yes     Partners: Male     Birth control/protection: I.U.D.   Social History Narrative    Lives at home with parents, 12 yo brother. No pets. Attending college in [employer], studying Mathematics and Computer Science. Denies alcohol, tobacco, drug use. Denies being sexually active.         MEDICATIONS:     Current Outpatient Medications on File Prior to Visit   Medication Sig Dispense Refill    albuterol (PROVENTIL/VENTOLIN HFA) 90  mcg/actuation inhaler INHALE 2 PUFFS BY MOUTH EVERY 4 TO 6 HOURS AS NEEDED FOR SHORTNESS OF BREATH OR WHEEZING      aspirin 81 MG Chew Take 81 mg by mouth once daily.      atorvastatin (LIPITOR) 10 MG tablet Take 10 mg by mouth once daily.      biotin 10,000 mcg Cap Biotin      biotin-keratin (BIOTIN PLUS KERATIN) 10,000-100 mcg-mg Tab Keratin      BREO ELLIPTA 100-25 mcg/dose diskus inhaler Inhale 1 puff into the lungs once daily.      brivaracetam (BRIVIACT) 50 mg Tab Briviact      buPROPion (WELLBUTRIN XL) 300 MG 24 hr tablet Take 1 tablet by mouth once daily.      busPIRone (BUSPAR) 7.5 MG tablet 15 mg 3 (three) times daily.      CELEBREX 200 mg capsule Take 200 mg by mouth 2 (two) times daily.      EPINEPHrine (EPIPEN) 0.3 mg/0.3 mL AtIn as directed Injection prn anaphylaxis      gabapentin (NEURONTIN) 300 MG capsule Take 600 mg by mouth 3 (three) times daily.      HYDROcodone-acetaminophen (NORCO) 5-325 mg per tablet Take 1 tablet by mouth every 8 (eight) hours as needed for Pain. (Patient taking differently: Take 7.5-325 tablets by mouth once daily.) 15 tablet 0    hydrOXYzine HCL (ATARAX) 25 MG tablet Take 25 mg by mouth every 6 (six) hours as needed.      lansoprazole (PREVACID) 30 MG capsule Take 1 capsule (30 mg total) by mouth once daily. 30 capsule 11    levomefolate calcium (L-METHYLFOLATE) 15 mg Tab L-Methylfolate      lisinopriL 10 MG tablet Take 10 mg by mouth once daily.      lubiprostone (AMITIZA) 24 MCG Cap Take 1 capsule (24 mcg total) by mouth 2 (two) times daily. 60 capsule 11    magnesium 200 mg Tab 400 mg daily      metFORMIN (GLUCOPHAGE-XR) 500 MG ER 24hr tablet Take 1 tablet (500 mg total) by mouth 2 (two) times daily with meals. 180 tablet 3    metoprolol succinate (TOPROL-XL) 50 MG 24 hr tablet Take 50 mg by mouth once daily.      montelukast (SINGULAIR) 10 mg tablet Take 10 mg by mouth once daily.      multivitamin-iron-folic acid (CENTRUM WOMEN) Tab Centrum Women      mupirocin  (BACTROBAN) 2 % ointment APPLY TOPICALLY TO THE AFFECTED AREA TWICE DAILY MIX W/ NYSTATIN AND APPLY TO NAIL FOLDS APPLY TO BIOPSY SITE      NURTEC 75 mg odt Take 75 mg by mouth every other day.      omalizumab (XOLAIR) 150 mg/mL injection       omega 3-dha-epa-fish oil (FISH OIL) 1,000 mg (120 mg-180 mg) Cap Take 1,000 mg by mouth 2 (two) times a day.      ondansetron (ZOFRAN) 8 MG tablet Take 8 mg by mouth 2 (two) times daily as needed.      OXcarbazepine (TRILEPTAL) 300 MG Tab Take 300 mg by mouth 2 (two) times daily.      pregabalin (LYRICA) 150 MG capsule Take 150 mg by mouth 3 (three) times daily.      QULIPTA 60 mg Tab Take 1 tablet by mouth.      spironolactone (ALDACTONE) 100 MG tablet Take 1 tablet (100 mg total) by mouth 2 (two) times daily. 90 tablet 3    tiZANidine (ZANAFLEX) 4 MG tablet       traZODone (DESYREL) 300 MG tablet Take 300 mg by mouth once daily.      valACYclovir (VALTREX) 1000 MG tablet Take 1,000 mg by mouth.      venlafaxine (EFFEXOR-XR) 150 MG Cp24 Take 2 capsules by mouth once daily.      vitamin D3-folic acid 125 mcg (5,000 unit)-1 mg Tab Vitamin D      zinc gluconate 50 mg tablet Take 50 mg by mouth once daily.       No current facility-administered medications on file prior to visit.       ALLERGIES:   Review of patient's allergies indicates:   Allergen Reactions    Diclofenac     Levalbuterol hcl      Other reaction(s): Headache        ROS:       Review of Systems   Constitutional:  Negative for appetite change and unexpected weight change.   HENT:   Negative for mouth sores.    Respiratory:  Negative for cough and shortness of breath.    Cardiovascular:  Negative for chest pain.   Gastrointestinal:  Positive for abdominal pain. Negative for diarrhea.   Genitourinary:  Negative for frequency.    Musculoskeletal:  Positive for back pain.   Skin:  Negative for rash.   Neurological:  Positive for headaches.   Hematological:  Negative for adenopathy.   Psychiatric/Behavioral:  The  "patient is not nervous/anxious.        PHYSICAL EXAM:  Vitals:    09/21/23 0808   BP: 111/71   Pulse: 71   Temp: 97.8 °F (36.6 °C)   TempSrc: Oral   SpO2: 99%   Weight: 112.8 kg (248 lb 10.9 oz)   Height: 5' 5" (1.651 m)   PainSc:   8   PainLoc: Abdomen       Physical Exam  Constitutional:       General: She is not in acute distress.     Appearance: She is well-developed.   HENT:      Head: Normocephalic and atraumatic.      Mouth/Throat:      Mouth: No oral lesions.   Eyes:      Conjunctiva/sclera: Conjunctivae normal.   Neck:      Thyroid: No thyromegaly.   Cardiovascular:      Rate and Rhythm: Normal rate and regular rhythm.      Heart sounds: Normal heart sounds. No murmur heard.  Pulmonary:      Breath sounds: Normal breath sounds. No wheezing or rales.   Abdominal:      General: There is no distension.      Palpations: Abdomen is soft. There is no hepatomegaly, splenomegaly or mass.      Tenderness: There is no abdominal tenderness.   Lymphadenopathy:      Cervical: No cervical adenopathy.      Right cervical: No deep cervical adenopathy.     Left cervical: No deep cervical adenopathy.   Skin:     Findings: Bruising (at site of venipuncture) present. No rash.   Neurological:      Mental Status: She is alert and oriented to person, place, and time.      Cranial Nerves: No cranial nerve deficit.      Coordination: Coordination normal.      Deep Tendon Reflexes: Reflexes are normal and symmetric.         LAB:   Results for orders placed or performed in visit on 09/19/23   Hemoglobin A1C   Result Value Ref Range    Hemoglobin A1C 4.9 4.0 - 5.6 %    Estimated Avg Glucose 94 68 - 131 mg/dL   TSH   Result Value Ref Range    TSH 0.571 0.400 - 4.000 uIU/mL   T4, Free   Result Value Ref Range    Free T4 1.02 0.71 - 1.51 ng/dL       PROBLEMS ASSESSED THIS VISIT:    No diagnosis found.    IMPRESSION:    1. Report of easy bruising    2. Hypertension  3. Dyslipidemia  4. Obesity - Body mass index is 41.38 kg/m².  5. " Polycystic ovarian syndrome  6. Insulin resistance  7. Hypothyroidism due to Hashimoto's thyroiditis  8. Hepatic steatosis  9. Sleep apnea    PLAN:       Leukocytosis  Nonspecific and related to increased neutrophil and upper limit of normal lymphocyte count. Peripheral blood flow cytometry performed and negative for abnormal cells. Provided reassurance.     Easy bruising  Suspect this is related to aspirin and celecoxib use. Celecoxib is a LINCOLN inhibitor and will reversibly inhibit platelet aggregation. Aspirin irreversibly inhibits platelets by way of the same mechanism.     Follow-up  Return to primary care  Follow-up only as needed    Bernardo Romero MD  Hematology and Stem Cell Transplant

## 2023-10-13 ENCOUNTER — PATIENT MESSAGE (OUTPATIENT)
Dept: GASTROENTEROLOGY | Facility: CLINIC | Age: 28
End: 2023-10-13
Payer: COMMERCIAL

## 2023-10-16 ENCOUNTER — PATIENT MESSAGE (OUTPATIENT)
Dept: OBSTETRICS AND GYNECOLOGY | Facility: CLINIC | Age: 28
End: 2023-10-16
Payer: COMMERCIAL

## 2023-10-16 DIAGNOSIS — Z30.014 ENCOUNTER FOR INITIAL PRESCRIPTION OF INTRAUTERINE CONTRACEPTIVE DEVICE (IUD): Primary | ICD-10-CM

## 2023-10-24 NOTE — TELEPHONE ENCOUNTER
Ordered liletta IUD to be placed and her next appt/annual. I see her appt is today but likely won't have it approved in time?

## 2023-10-24 NOTE — TELEPHONE ENCOUNTER
She would like to reschedule the appointment from today and reschedule when we can do the IUD at the same time. Please call to reschedule and link the order.

## 2023-10-30 ENCOUNTER — TELEPHONE (OUTPATIENT)
Dept: OBSTETRICS AND GYNECOLOGY | Facility: CLINIC | Age: 28
End: 2023-10-30
Payer: COMMERCIAL

## 2023-10-30 NOTE — TELEPHONE ENCOUNTER
----- Message from Flakita Parker sent at 10/30/2023 12:09 PM CDT -----  Regarding: Call back  Contact: [phone]  Who Called: PT     Type:  Patient Returning Call    Who Called: PT   Who Left Message for Patient: Nurse   Does the patient know what this is regarding?: Yes   Would the patient rather a call back or a response via [facility]? Call Back   Best Call Back Number: [phone]  Additional Information:

## 2023-10-31 ENCOUNTER — HOSPITAL ENCOUNTER (OUTPATIENT)
Dept: RADIOLOGY | Facility: HOSPITAL | Age: 28
Discharge: HOME | End: 2023-10-31
Attending: NURSE PRACTITIONER
Payer: COMMERCIAL

## 2023-10-31 DIAGNOSIS — R13.10 DYSPHAGIA, UNSPECIFIED TYPE: ICD-10-CM

## 2023-10-31 PROCEDURE — 74230 X-RAY XM SWLNG FUNCJ C+: CPT | Mod: TC

## 2023-10-31 PROCEDURE — 92611 MOTION FLUOROSCOPY/SWALLOW: CPT

## 2023-10-31 PROCEDURE — 74230 FL MODIFIED BARIUM SWALLOW SPEECH STUDY: ICD-10-PCS | Mod: 26,,, | Performed by: RADIOLOGY

## 2023-10-31 PROCEDURE — 74230 X-RAY XM SWLNG FUNCJ C+: CPT | Mod: 26,,, | Performed by: RADIOLOGY

## 2023-10-31 PROCEDURE — 25500020 PHARM REV CODE 255: Performed by: NURSE PRACTITIONER

## 2023-10-31 PROCEDURE — A9698 NON-RAD CONTRAST MATERIALNOC: HCPCS | Performed by: NURSE PRACTITIONER

## 2023-10-31 PROCEDURE — 97535 SELF CARE MNGMENT TRAINING: CPT

## 2023-10-31 RX ADMIN — BARIUM SULFATE 30 ML: 0.81 POWDER, FOR SUSPENSION ORAL at 10:10

## 2023-10-31 NOTE — PROCEDURES
Modified Barium Swallow    Patient Name:  Charlene Mohan   MRN:  [id number]      Recommendations:     Recommendations:                General Recommendations:  Follow-up not indicated  Diet recommendations:  Regular Diet - IDDSI Level 7, Thin liquids - IDDSI Level 0   Aspiration Precautions: 1 bite/sip at a time, Alternating bites/sips, HOB to 90 degrees, Meds whole 1 at a time, and Small bites/sips   General Precautions: Standard,    Communication strategies:  none    Referral     Reason for Referral  Patient was referred for a Modified Barium Swallow Study to assess the efficiency of his/her swallow function, rule out aspiration and make recommendations regarding safe dietary consistencies, effective compensatory strategies, and safe eating environment.     Diagnosis: <principal problem not specified>       History:     Past Medical History:   Diagnosis Date    Dyslipidemia     Dysmetabolic syndrome X     Essential hypertension, benign     History of polycystic ovarian syndrome     Hypothyroidism     Obesity        Objective:     Current Respiratory Status: 10/31/23    Alert: yes    Cooperative: yes    Follows Directions: yes    Visualization  Patient was seen in the lateral view    Oral Peripheral Examination  Oral Musculature: WFL  Dentition: present and adequate  Secretion Management: adequate  Mucosal Quality: good  Oral Labial Strength and Mobility: WFL  Lingual Strength and Mobility: WFL  Volitional Cough: Strong  Volitional Swallow: Intact  Voice Prior to PO Intake: Clear and audible    Consistencies Assessed  Thin ~30 mL via straw  Puree ~10 mL via tsp  Solids -x1 bite sized piece of cuca cracker     Oral Preparation/Oral Phase  WFL- Pt with adequate bolus acceptance, containment, control and timely A-P transfer across consistencies     Pharyngeal Phase   Pt with adequate base of tongue retraction, with subsequent timely swallow initiation across boluses. Complete epiglottic inversion noted for subsequent  airway protection. No penetration or aspiration noted across consistencies. Hyolaryngeal elevation and excursion WFL, leading to no amounts of stasis post swallow across consistencies.    Cervical Esophageal Phase  Decreased UES opening    Assessment:     Impressions    Pt presents with functional swallowing abilities and is w/o oropharyngeal dysphagia. No penetration or aspiration across consistencies. Esophageal dysphagia noted 2/2 reduced UES opening and mild esophgeal stasis.     Prognosis: Excellent    Barriers:  None    Plan  ST recs cont current regular diet with thin liquids. GI f/u yearly 2/2 hx of recent esophogeal dilation.     Education  ST utilized live swallow study video via monitor to discuss the following with the pt: normal swallowing anatomy and physiology, pt's specific anatomy and physiology, diet recs, as well as swallowing precautions.      Plan:   Patient to be seen:      Plan of Care expires:     Plan of Care reviewed with:  patient        Discharge recommendations:      Barriers to Discharge:  None    Time Tracking:   SLP Treatment Date:   10/31/23  Speech Start Time:  1000  Speech Stop Time:  1030     Speech Total Time (min):  30 min    10/31/2023

## 2023-11-10 ENCOUNTER — PATIENT MESSAGE (OUTPATIENT)
Dept: OBSTETRICS AND GYNECOLOGY | Facility: CLINIC | Age: 28
End: 2023-11-10
Payer: COMMERCIAL

## 2023-11-14 NOTE — TELEPHONE ENCOUNTER
Pt requested that she could do an appointment anytime after 11 Monday-Friday so was able to get her an appointment on 11/29/23 at 2:15 pm

## 2023-11-19 ENCOUNTER — ON-DEMAND VIRTUAL (OUTPATIENT)
Dept: URGENT CARE | Facility: CLINIC | Age: 28
End: 2023-11-19
Payer: COMMERCIAL

## 2023-11-19 DIAGNOSIS — B36.9 FUNGAL INFECTION OF SKIN: Primary | ICD-10-CM

## 2023-11-19 PROCEDURE — 99213 PR OFFICE/OUTPT VISIT, EST, LEVL III, 20-29 MIN: ICD-10-PCS | Mod: 95,,,

## 2023-11-19 PROCEDURE — 99213 OFFICE O/P EST LOW 20 MIN: CPT | Mod: 95,,,

## 2023-11-19 RX ORDER — FLUCONAZOLE 150 MG/1
TABLET ORAL
Qty: 2 TABLET | Refills: 0 | Status: SHIPPED | OUTPATIENT
Start: 2023-11-19

## 2023-11-19 RX ORDER — NYSTATIN 100000 U/G
OINTMENT TOPICAL 2 TIMES DAILY
Qty: 30 G | Refills: 1 | Status: SHIPPED | OUTPATIENT
Start: 2023-11-19

## 2023-11-20 NOTE — PATIENT INSTRUCTIONS
You must understand that you've received an Urgent Care treatment only and that you may be released before all your medical problems are known or treated. You, the patient, will arrange for follow up care as instructed.  Follow up with your PCP or specialty clinic as directed in the next 1-2 weeks if not improved or as needed.  You can call [phone] to schedule an appointment with the appropriate provider.  If your condition worsens we recommend that you receive another evaluation at the emergency room immediately or contact your primary medical clinics after hours call service to discuss your concerns.  Please return here or go to the Emergency Department for any concerns or worsening of condition.  Please if you smoke please consider quitting. [facility] Sm[facility] cessation hotline number is [phone], available at this number is free counseling and medications to live a healthier life!         If you were prescribed a narcotic or controlled medication, do not drive or operate heavy equipment or machinery while taking these medications.

## 2023-11-20 NOTE — PROGRESS NOTES
Subjective:      Patient ID: Charlene Mohan is a 28 y.o. female.    Vitals:  vitals were not taken for this visit.     Chief Complaint: Rash      Visit Type: TELE AUDIOVISUAL    Present with the patient at the time of consultation: TELEMED PRESENT WITH PATIENT: None    Past Medical History:   Diagnosis Date    Dyslipidemia     Dysmetabolic syndrome X     Essential hypertension, benign     History of polycystic ovarian syndrome     Hypothyroidism     Obesity      Past Surgical History:   Procedure Laterality Date    ADENOIDECTOMY      ESOPHAGOGASTRODUODENOSCOPY N/A 8/9/2023    Procedure: EGD (ESOPHAGOGASTRODUODENOSCOPY);  Surgeon: Fadi Pederson MD;  Location: [facility];  Service: Endoscopy;  Laterality: N/A;    SPINAL CORD STIMULATOR IMPLANT      TONSILLECTOMY      WISDOM TOOTH EXTRACTION       Review of patient's allergies indicates:   Allergen Reactions    Diclofenac     Levalbuterol hcl      Other reaction(s): Headache     Current Outpatient Medications on File Prior to Visit   Medication Sig Dispense Refill    albuterol (PROVENTIL/VENTOLIN HFA) 90 mcg/actuation inhaler INHALE 2 PUFFS BY MOUTH EVERY 4 TO 6 HOURS AS NEEDED FOR SHORTNESS OF BREATH OR WHEEZING      aspirin 81 MG Chew Take 81 mg by mouth once daily.      atorvastatin (LIPITOR) 10 MG tablet Take 10 mg by mouth once daily.      biotin 10,000 mcg Cap Biotin      biotin-keratin (BIOTIN PLUS KERATIN) 10,000-100 mcg-mg Tab Keratin      BREO ELLIPTA 100-25 mcg/dose diskus inhaler Inhale 1 puff into the lungs once daily.      brivaracetam (BRIVIACT) 50 mg Tab Briviact      buPROPion (WELLBUTRIN XL) 300 MG 24 hr tablet Take 1 tablet by mouth once daily.      busPIRone (BUSPAR) 7.5 MG tablet 15 mg 3 (three) times daily.      CELEBREX 200 mg capsule Take 200 mg by mouth 2 (two) times daily.      EPINEPHrine (EPIPEN) 0.3 mg/0.3 mL AtIn as directed Injection prn anaphylaxis      gabapentin (NEURONTIN) 300 MG capsule Take 600 mg by mouth 3 (three) times  daily.      HYDROcodone-acetaminophen (NORCO) 5-325 mg per tablet Take 1 tablet by mouth every 8 (eight) hours as needed for Pain. (Patient taking differently: Take 7.5-325 tablets by mouth once daily.) 15 tablet 0    hydrOXYzine HCL (ATARAX) 25 MG tablet Take 25 mg by mouth every 6 (six) hours as needed.      lansoprazole (PREVACID) 30 MG capsule Take 1 capsule (30 mg total) by mouth once daily. 30 capsule 11    levomefolate calcium (L-METHYLFOLATE) 15 mg Tab L-Methylfolate      lisinopriL 10 MG tablet Take 10 mg by mouth once daily.      lubiprostone (AMITIZA) 24 MCG Cap Take 1 capsule (24 mcg total) by mouth 2 (two) times daily. 60 capsule 11    magnesium 200 mg Tab 400 mg daily      metFORMIN (GLUCOPHAGE-XR) 500 MG ER 24hr tablet Take 1 tablet (500 mg total) by mouth 2 (two) times daily with meals. 180 tablet 3    metoprolol succinate (TOPROL-XL) 50 MG 24 hr tablet Take 50 mg by mouth once daily.      montelukast (SINGULAIR) 10 mg tablet Take 10 mg by mouth once daily.      multivitamin-iron-folic acid (CENTRUM WOMEN) Tab Centrum Women      mupirocin (BACTROBAN) 2 % ointment APPLY TOPICALLY TO THE AFFECTED AREA TWICE DAILY MIX W/ NYSTATIN AND APPLY TO NAIL FOLDS APPLY TO BIOPSY SITE      NURTEC 75 mg odt Take 75 mg by mouth every other day.      omalizumab (XOLAIR) 150 mg/mL injection       omega 3-dha-epa-fish oil (FISH OIL) 1,000 mg (120 mg-180 mg) Cap Take 1,000 mg by mouth 2 (two) times a day.      ondansetron (ZOFRAN) 8 MG tablet Take 8 mg by mouth 2 (two) times daily as needed.      OXcarbazepine (TRILEPTAL) 300 MG Tab Take 300 mg by mouth 2 (two) times daily.      pregabalin (LYRICA) 150 MG capsule Take 150 mg by mouth 3 (three) times daily.      QULIPTA 60 mg Tab Take 1 tablet by mouth.      spironolactone (ALDACTONE) 100 MG tablet Take 1 tablet (100 mg total) by mouth 2 (two) times daily. 90 tablet 3    tiZANidine (ZANAFLEX) 4 MG tablet       traZODone (DESYREL) 300 MG tablet Take 300 mg by mouth once  daily.      valACYclovir (VALTREX) 1000 MG tablet Take 1,000 mg by mouth.      venlafaxine (EFFEXOR-XR) 150 MG Cp24 Take 2 capsules by mouth once daily.      vitamin D3-folic acid 125 mcg (5,000 unit)-1 mg Tab Vitamin D      zinc gluconate 50 mg tablet Take 50 mg by mouth once daily.       No current facility-administered medications on file prior to visit.     Family History   Problem Relation Age of Onset    Hypertension Paternal Grandfather     Diabetes Paternal Grandfather     Colon cancer Paternal Grandmother     Hypertension Paternal Grandmother     Diabetes Paternal Grandmother     Diabetes Maternal Grandmother     Hypertension Father     Hyperlipidemia Father     Thyroid disease Mother     Breast cancer Neg Hx     Ovarian cancer Neg Hx        Medications Ordered                [id number] - [place] - [address] AT [facility] [address]   [address], [place] [address]    Telephone: [phone]   Fax: [phone]   Hours: Not open 24 hours                         E-Prescribed (2 of 2)              fluconazole (DIFLUCAN) 150 MG Tab    Sig: Take one table now and repeat in 3 days       Start: 11/19/23     Quantity: 2 tablet Refills: 0                         nystatin (MYCOSTATIN) ointment    Sig: Apply topically 2 (two) times daily.       Start: 11/19/23     Quantity: 30 g Refills: 1                           Ohs Peq Odvv Intake    11/19/2023  9:19 PM CST - Filed by Patient   Describe your reason for todays visit Yeast infection   What is your current physical address in the event of a medical emergency? [address] [place] [address], [facility] [address], [place] [address]   Are you able to take your vital signs? No   Please attach any relevant images or files          Patient states that for the past week she has been having a rash on her abdominal and leg area. Patients states that the rash has continued to get worse. Patient states that the rash does burn. Patient  denies any other symptom.         Constitution: Negative.   Neck: neck negative.   Cardiovascular: Negative.    Eyes: Negative.    Gastrointestinal: Negative.    Endocrine: negative.   Genitourinary: Negative.    Musculoskeletal: Negative.    Skin:  Positive for rash.   Allergic/Immunologic: Negative.    Neurological: Negative.    Hematologic/Lymphatic: Negative.    Psychiatric/Behavioral: Negative.          Objective:   The physical exam was conducted virtually.  Physical Exam   Constitutional: She is oriented to person, place, and time. obesity  HENT:   Head: Normocephalic and atraumatic.   Eyes: Conjunctivae are normal. Pupils are equal, round, and reactive to light. Extraocular movement intact   Neck: Neck supple.   Pulmonary/Chest: Effort normal.   Abdominal: Normal appearance.   Musculoskeletal: Normal range of motion.         General: Normal range of motion.   Neurological: no focal deficit. She is alert, oriented to person, place, and time and at baseline.   Skin: Skin is rash.         Comments: See image for details   Psychiatric: Her behavior is normal. Mood, judgment and thought content normal.       Assessment:           1. Fungal infection of skin        Plan:       Fungal infection of skin  -     fluconazole (DIFLUCAN) 150 MG Tab; Take one table now and repeat in 3 days  Dispense: 2 tablet; Refill: 0  -     nystatin (MYCOSTATIN) ointment; Apply topically 2 (two) times daily.  Dispense: 30 g; Refill: 1

## 2023-11-29 ENCOUNTER — PROCEDURE VISIT (OUTPATIENT)
Dept: OBSTETRICS AND GYNECOLOGY | Facility: CLINIC | Age: 28
End: 2023-11-29
Payer: COMMERCIAL

## 2023-11-29 VITALS
WEIGHT: 250.88 LBS | SYSTOLIC BLOOD PRESSURE: 110 MMHG | DIASTOLIC BLOOD PRESSURE: 73 MMHG | BODY MASS INDEX: 41.75 KG/M2

## 2023-11-29 DIAGNOSIS — Z30.433 ENCOUNTER FOR REMOVAL AND REINSERTION OF IUD: Primary | ICD-10-CM

## 2023-11-29 PROCEDURE — 58301 REMOVE INTRAUTERINE DEVICE: CPT | Mod: S$GLB,,, | Performed by: OBSTETRICS & GYNECOLOGY

## 2023-11-29 PROCEDURE — 58300 INSERTION OF IUD: ICD-10-PCS | Mod: S$GLB,,, | Performed by: OBSTETRICS & GYNECOLOGY

## 2023-11-29 PROCEDURE — 99499 UNLISTED E&M SERVICE: CPT | Mod: S$GLB,,, | Performed by: OBSTETRICS & GYNECOLOGY

## 2023-11-29 PROCEDURE — 58301 PR REMOVE, INTRAUTERINE DEVICE: ICD-10-PCS | Mod: S$GLB,,, | Performed by: OBSTETRICS & GYNECOLOGY

## 2023-11-29 PROCEDURE — 99499 NO LOS: ICD-10-PCS | Mod: S$GLB,,, | Performed by: OBSTETRICS & GYNECOLOGY

## 2023-11-29 PROCEDURE — 58300 INSERT INTRAUTERINE DEVICE: CPT | Mod: S$GLB,,, | Performed by: OBSTETRICS & GYNECOLOGY

## 2023-11-30 NOTE — PROCEDURES
Insertion of IUD    Date/Time: 11/29/2023 2:15 PM    Performed by: Charley Merritt MD  Authorized by: Charley Merritt MD    Consent:     Consent given by:  Patient    Procedure risks and benefits discussed: yes      Patient questions answered: yes      Patient agrees, verbalizes understanding, and wants to proceed: yes     Device to be inserted was verified by patient: yes    Educational handouts given: yes      Instructions and paperwork completed: yes    Insertion Procedure:   18.6 mcg levonorgestreL 20.4 mcg/24 hrs (8 yrs) 52 mg       Pelvic exam performed: yes      Negative urine pregnancy test: yes      Cervix cleaned and prepped: yes      Speculum placed in vagina: yes      Tenaculum applied to cervix: yes      Uterus sounded: yes      IUD inserted with no complications: yes      IUD type:  Liletta    Strings trimmed: yes    Post-procedure:     Patient tolerated procedure well: yes      Patient will follow up after next period: yes    Comments:      PROCEDURE: removal and replacement IUD     PRE-IUD REMOVAL COUNSELING:  The patient was advised of minimal risks of bleeding and pain and she agrees to proceed. She would like to do replace liletta for birth control after IUD removed.     PROCEDURE:  TIME OUT PERFORMED.  IUD strings were visualized at the os and grasped with ringed forceps. IUD removed with gentle traction.  The patient tolerated the procedure well then new one inserted, see note

## 2024-01-31 ENCOUNTER — OFFICE VISIT (OUTPATIENT)
Dept: OPHTHALMOLOGY | Facility: CLINIC | Age: 29
End: 2024-01-31
Payer: COMMERCIAL

## 2024-01-31 DIAGNOSIS — H52.223 REGULAR ASTIGMATISM OF BOTH EYES: Primary | ICD-10-CM

## 2024-01-31 PROCEDURE — 4010F ACE/ARB THERAPY RXD/TAKEN: CPT | Mod: CPTII,S$GLB,, | Performed by: OPHTHALMOLOGY

## 2024-01-31 PROCEDURE — 92014 COMPRE OPH EXAM EST PT 1/>: CPT | Mod: S$GLB,,, | Performed by: OPHTHALMOLOGY

## 2024-01-31 PROCEDURE — 1160F RVW MEDS BY RX/DR IN RCRD: CPT | Mod: CPTII,S$GLB,, | Performed by: OPHTHALMOLOGY

## 2024-01-31 PROCEDURE — 99999 PR PBB SHADOW E&M-EST. PATIENT-LVL IV: CPT | Mod: PBBFAC,,, | Performed by: OPHTHALMOLOGY

## 2024-01-31 PROCEDURE — 1159F MED LIST DOCD IN RCRD: CPT | Mod: CPTII,S$GLB,, | Performed by: OPHTHALMOLOGY

## 2024-01-31 RX ORDER — ROSUVASTATIN CALCIUM 20 MG/1
20 TABLET, COATED ORAL
COMMUNITY
Start: 2024-01-24

## 2024-01-31 RX ORDER — VENLAFAXINE 75 MG/1
TABLET ORAL
COMMUNITY
Start: 2023-11-01

## 2024-01-31 RX ORDER — BUSPIRONE HYDROCHLORIDE 30 MG/1
30 TABLET ORAL 2 TIMES DAILY
COMMUNITY
Start: 2024-01-23

## 2024-01-31 RX ORDER — BUPROPION HYDROCHLORIDE 150 MG/1
150 TABLET ORAL EVERY MORNING
COMMUNITY
Start: 2024-01-08 | End: 2024-04-08

## 2024-01-31 NOTE — PROGRESS NOTES
HPI    DLS 12/14/2022 Dr. Erwin  [facility] OU decreased gradaully for six months. Saw Dr Paredes he rx new glasses   has not picked them up yet.  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            Assessment /Plan     For exam results, see Encounter Report.    Regular astigmatism of both eyes      Repeat Pentacam today vs 12/2022 shows stability, no changes or signs of progression or ectasia  Regular High WTR OU  Pentacams both at

## 2024-02-02 DIAGNOSIS — K21.9 GASTROESOPHAGEAL REFLUX DISEASE, UNSPECIFIED WHETHER ESOPHAGITIS PRESENT: ICD-10-CM

## 2024-02-02 DIAGNOSIS — K59.04 CHRONIC IDIOPATHIC CONSTIPATION: ICD-10-CM

## 2024-02-02 RX ORDER — LUBIPROSTONE 24 UG/1
24 CAPSULE ORAL 2 TIMES DAILY
Qty: 60 CAPSULE | Refills: 11 | Status: SHIPPED | OUTPATIENT
Start: 2024-02-02 | End: 2025-02-01

## 2024-02-02 RX ORDER — LANSOPRAZOLE 30 MG/1
30 CAPSULE, DELAYED RELEASE ORAL DAILY
Qty: 30 CAPSULE | Refills: 11 | Status: SHIPPED | OUTPATIENT
Start: 2024-02-02 | End: 2025-02-01

## 2024-02-06 ENCOUNTER — PATIENT MESSAGE (OUTPATIENT)
Dept: GASTROENTEROLOGY | Facility: CLINIC | Age: 29
End: 2024-02-06
Payer: COMMERCIAL

## 2024-03-27 ENCOUNTER — PATIENT MESSAGE (OUTPATIENT)
Dept: GASTROENTEROLOGY | Facility: CLINIC | Age: 29
End: 2024-03-27
Payer: COMMERCIAL

## 2024-04-08 ENCOUNTER — OFFICE VISIT (OUTPATIENT)
Dept: CARDIOLOGY | Facility: CLINIC | Age: 29
End: 2024-04-08
Payer: COMMERCIAL

## 2024-04-08 VITALS
SYSTOLIC BLOOD PRESSURE: 112 MMHG | HEIGHT: 65 IN | HEART RATE: 81 BPM | OXYGEN SATURATION: 97 % | WEIGHT: 260.56 LBS | RESPIRATION RATE: 15 BRPM | DIASTOLIC BLOOD PRESSURE: 71 MMHG | BODY MASS INDEX: 43.41 KG/M2

## 2024-04-08 DIAGNOSIS — E66.01 MORBID OBESITY: ICD-10-CM

## 2024-04-08 DIAGNOSIS — E28.2 PCOS (POLYCYSTIC OVARIAN SYNDROME): ICD-10-CM

## 2024-04-08 DIAGNOSIS — E78.5 DYSLIPIDEMIA: ICD-10-CM

## 2024-04-08 DIAGNOSIS — E88.819 INSULIN RESISTANCE: ICD-10-CM

## 2024-04-08 DIAGNOSIS — I10 PRIMARY HYPERTENSION: ICD-10-CM

## 2024-04-08 DIAGNOSIS — K76.0 STEATOSIS OF LIVER: ICD-10-CM

## 2024-04-08 DIAGNOSIS — R07.89 OTHER CHEST PAIN: Primary | ICD-10-CM

## 2024-04-08 LAB
OHS QRS DURATION: 92 MS
OHS QTC CALCULATION: 453 MS

## 2024-04-08 PROCEDURE — 3008F BODY MASS INDEX DOCD: CPT | Mod: CPTII,S$GLB,, | Performed by: INTERNAL MEDICINE

## 2024-04-08 PROCEDURE — 3078F DIAST BP <80 MM HG: CPT | Mod: CPTII,S$GLB,, | Performed by: INTERNAL MEDICINE

## 2024-04-08 PROCEDURE — 93000 ELECTROCARDIOGRAM COMPLETE: CPT | Mod: S$GLB,,, | Performed by: INTERNAL MEDICINE

## 2024-04-08 PROCEDURE — 99999 PR PBB SHADOW E&M-EST. PATIENT-LVL V: CPT | Mod: PBBFAC,,, | Performed by: INTERNAL MEDICINE

## 2024-04-08 PROCEDURE — 3074F SYST BP LT 130 MM HG: CPT | Mod: CPTII,S$GLB,, | Performed by: INTERNAL MEDICINE

## 2024-04-08 PROCEDURE — 99204 OFFICE O/P NEW MOD 45 MIN: CPT | Mod: S$GLB,,, | Performed by: INTERNAL MEDICINE

## 2024-04-08 PROCEDURE — 1159F MED LIST DOCD IN RCRD: CPT | Mod: CPTII,S$GLB,, | Performed by: INTERNAL MEDICINE

## 2024-04-08 PROCEDURE — 4010F ACE/ARB THERAPY RXD/TAKEN: CPT | Mod: CPTII,S$GLB,, | Performed by: INTERNAL MEDICINE

## 2024-04-08 NOTE — PROGRESS NOTES
CARDIOLOGY CLINIC VISIT        HISTORY OF PRESENT ILLNESS:     04/08/2024: Charlene Mohan presents for evaluation of chest discomfort.  Symptoms over the past 2 weeks.  Left sided.  Exertional.  Symptoms can last upwards of 30 minutes.  Associated shortness of breath.  History of GERD.  These symptoms are different according to the patient.  History of hypertension.  Diagnosed at age 11.  States that she underwent an evaluation for secondary causes that was negative.  Dyslipidemia on medications since the age of 14.  Denies family history of premature coronary artery disease.  Nonsmoker.  No alcohol use.  EKG shows normal sinus rhythm.  Nonspecific T-wave abnormality.      CARDIOVASCULAR HISTORY:     None    PAST MEDICAL HISTORY:     Past Medical History:   Diagnosis Date    Dyslipidemia     Dysmetabolic syndrome X     Essential hypertension, benign     History of polycystic ovarian syndrome     Hypothyroidism     Obesity        PAST SURGICAL HISTORY:     Past Surgical History:   Procedure Laterality Date    ADENOIDECTOMY      ESOPHAGOGASTRODUODENOSCOPY N/A 8/9/2023    Procedure: EGD (ESOPHAGOGASTRODUODENOSCOPY);  Surgeon: Fadi Pederson MD;  Location: [facility];  Service: Endoscopy;  Laterality: N/A;    SPINAL CORD STIMULATOR IMPLANT      TONSILLECTOMY      WISDOM TOOTH EXTRACTION         ALLERGIES AND MEDICATION:     Review of patient's allergies indicates:   Allergen Reactions    Diclofenac     Levalbuterol hcl      Other reaction(s): Headache        Medication List            Accurate as of April 8, 2024  1:10 PM. If you have any questions, ask your nurse or doctor.                CHANGE how you take these medications      HYDROcodone-acetaminophen 5-325 mg per tablet  Commonly known as: NORCO  Take 1 tablet by mouth every 8 (eight) hours as needed for Pain.  What changed:   how much to take  when to take this            CONTINUE taking these medications      albuterol 90 mcg/actuation inhaler  Commonly known  as: PROVENTIL/VENTOLIN HFA     aspirin 81 MG Chew     atorvastatin 10 MG tablet  Commonly known as: LIPITOR     biotin 10,000 mcg Cap     BIOTIN PLUS KERATIN 10,000-100 mcg-mg Tab  Generic drug: biotin-keratin     BREO ELLIPTA 100-25 mcg/dose diskus inhaler  Generic drug: fluticasone furoate-vilanteroL     BRIVIACT 50 mg Tab  Generic drug: brivaracetam     * buPROPion 300 MG 24 hr tablet  Commonly known as: WELLBUTRIN XL     * buPROPion 150 MG TB24 tablet  Commonly known as: WELLBUTRIN XL     * busPIRone 7.5 MG tablet  Commonly known as: BUSPAR     * busPIRone 30 MG Tab  Commonly known as: BUSPAR     CeleBREX 200 MG capsule  Generic drug: celecoxib     CENTRUM WOMEN Tab  Generic drug: multivitamin-iron-folic acid     EPINEPHrine 0.3 mg/0.3 mL Atin  Commonly known as: EPIPEN     Fish OiL 1,000 mg (120 mg-180 mg) Cap  Generic drug: omega 3-dha-epa-fish oil     fluconazole 150 MG Tab  Commonly known as: DIFLUCAN  Take one table now and repeat in 3 days     gabapentin 300 MG capsule  Commonly known as: NEURONTIN     hydrOXYzine HCL 25 MG tablet  Commonly known as: ATARAX     L-METHYLFOLATE 15 mg Tab  Generic drug: levomefolate calcium     lansoprazole 30 MG capsule  Commonly known as: PREVACID  Take 1 capsule (30 mg total) by mouth once daily.     lisinopriL 10 MG tablet     lubiprostone 24 MCG Cap  Commonly known as: AMITIZA  Take 1 capsule (24 mcg total) by mouth 2 (two) times daily.     magnesium 200 mg Tab     metFORMIN 500 MG ER 24hr tablet  Commonly known as: GLUCOPHAGE-XR  Take 1 tablet (500 mg total) by mouth 2 (two) times daily with meals.     metoprolol succinate 50 MG 24 hr tablet  Commonly known as: TOPROL-XL     montelukast 10 mg tablet  Commonly known as: SINGULAIR     mupirocin 2 % ointment  Commonly known as: BACTROBAN     NURTEC 75 mg odt  Generic drug: rimegepant     nystatin ointment  Commonly known as: MYCOSTATIN  Apply topically 2 (two) times daily.     ondansetron 8 MG tablet  Commonly known as:  ZOFRAN     OXcarbazepine 300 MG Tab  Commonly known as: TRILEPTAL     pregabalin 150 MG capsule  Commonly known as: LYRICA     QULIPTA 60 mg Tab  Generic drug: atogepant     rosuvastatin 20 MG tablet  Commonly known as: CRESTOR     spironolactone 100 MG tablet  Commonly known as: ALDACTONE  Take 1 tablet (100 mg total) by mouth 2 (two) times daily.     tiZANidine 4 MG tablet  Commonly known as: ZANAFLEX     traZODone 300 MG tablet  Commonly known as: DESYREL     valACYclovir 1000 MG tablet  Commonly known as: VALTREX     * venlafaxine 150 MG Cp24  Commonly known as: EFFEXOR-XR     * venlafaxine 75 MG tablet  Commonly known as: EFFEXOR     vitamin D3-folic acid 125 mcg (5,000 unit)-1 mg Tab     XOLAIR 150 mg/mL injection  Generic drug: omalizumab     zinc gluconate 50 mg tablet           * This list has 6 medication(s) that are the same as other medications prescribed for you. Read the directions carefully, and ask your doctor or other care provider to review them with you.                  SOCIAL HISTORY:     Social History     Socioeconomic History    Marital status:    Tobacco Use    Smoking status: Never    Smokeless tobacco: Never   Substance and Sexual Activity    Alcohol use: No    Drug use: No    Sexual activity: Yes     Partners: Male     Birth control/protection: I.U.D.   Social History Narrative    Lives at home with parents, 12 yo brother. No pets. Attending college in [employer], studying Mathematics and Computer Science. Denies alcohol, tobacco, drug use. Denies being sexually active.     Social Determinants of Health     Financial Resource Strain: Medium Risk (1/24/2024)    Overall Financial Resource Strain (CARDIA)     Difficulty of Paying Living Expenses: Somewhat hard   Food Insecurity: Food Insecurity Present (1/24/2024)    Hunger Vital Sign     Worried About Running Out of Food in the Last Year: Often true     Ran Out of Food in the Last Year: Never true   Transportation Needs: No  Transportation Needs (1/24/2024)    PRAPARE - Transportation     Lack of Transportation (Medical): No     Lack of Transportation (Non-Medical): No   Physical Activity: Patient Declined (1/24/2024)    Exercise Vital Sign     Days of Exercise per Week: Patient declined     Minutes of Exercise per Session: Patient declined   Stress: No Stress Concern Present (1/24/2024)    [employer] - Occupational Stress Questionnaire     Feeling of Stress : Only a little   Social Connections: Unknown (1/24/2024)    Social Connection and Isolation Panel [NHANES]     Frequency of Communication with Friends and Family: More than three times a week     Frequency of Social Gatherings with Friends and Family: Once a week     Active Member of Clubs or Organizations: Yes     Attends Club or Organization Meetings: More than 4 times per year     Marital Status:    Housing Stability: High Risk (1/24/2024)    Housing Stability Vital Sign     Unable to Pay for Housing in the Last Year: Yes     Number of Places Lived in the Last Year: 2     Unstable Housing in the Last Year: No       FAMILY HISTORY:     Family History   Problem Relation Age of Onset    Hypertension Paternal Grandfather     Diabetes Paternal Grandfather     Colon cancer Paternal Grandmother     Hypertension Paternal Grandmother     Diabetes Paternal Grandmother     Diabetes Maternal Grandmother     Hypertension Father     Hyperlipidemia Father     Thyroid disease Mother     Breast cancer Neg Hx     Ovarian cancer Neg Hx        REVIEW OF SYSTEMS:   Review of Systems   Constitutional:  Negative for chills, diaphoresis, fever, malaise/fatigue and weight loss.   HENT:  Negative for congestion, hearing loss, sinus pain, sore throat and tinnitus.    Eyes:  Negative for blurred vision, double vision, photophobia and pain.   Respiratory:  Positive for shortness of breath. Negative for cough, hemoptysis, sputum production, wheezing and stridor.   "  Cardiovascular:  Positive for chest pain. Negative for palpitations, orthopnea, claudication, leg swelling and PND.   Gastrointestinal:  Negative for abdominal pain, blood in stool, heartburn, melena, nausea and vomiting.   Musculoskeletal:  Negative for back pain, falls, joint pain, myalgias and neck pain.   Neurological:  Negative for dizziness, tingling, tremors, sensory change, speech change, focal weakness, seizures, loss of consciousness, weakness and headaches.   Endo/Heme/Allergies:  Does not bruise/bleed easily.   Psychiatric/Behavioral:  Negative for depression, memory loss and substance abuse. The patient is not nervous/anxious.        PHYSICAL EXAM:     Vitals:    04/08/24 1254   BP: 112/71   Pulse: 81   Resp: 15    Body mass index is 43.36 kg/m².  Weight: 118.2 kg (260 lb 9.3 oz)   Height: 5' 5" (165.1 cm)     Physical Exam  Vitals reviewed.   Constitutional:       General: She is not in acute distress.     Appearance: She is well-developed. She is obese. She is not diaphoretic.   HENT:      Head: Normocephalic.   Neck:      Vascular: No carotid bruit or JVD.   Cardiovascular:      Rate and Rhythm: Normal rate and regular rhythm.      Pulses: Normal pulses.      Heart sounds: Normal heart sounds.   Pulmonary:      Effort: Pulmonary effort is normal.      Breath sounds: Normal breath sounds.   Abdominal:      General: Bowel sounds are normal.      Palpations: Abdomen is soft.      Tenderness: There is no abdominal tenderness.   Skin:     General: Skin is warm and dry.   Neurological:      Mental Status: She is alert and oriented to person, place, and time.   Psychiatric:         Speech: Speech normal.         Behavior: Behavior normal.         Thought Content: Thought content normal.       DATA:   EKG: (personally reviewed tracing)  04/08/2024-normal sinus rhythm, nonspecific T-wave abnormality  Laboratory:  CBC:  Recent Labs   Lab 10/04/22  1044 11/08/22  1058 09/21/23  0843   WBC 10.20 9.50 13.01 H "   Hemoglobin 12.4 12.1 13.0   Hematocrit 36.2 L 35.0 L 39.9   Platelets 331 308 348       CHEMISTRIES:  Recent Labs   Lab 06/23/21 1955 07/27/22  0954 09/04/22  1921 10/04/22  1044 11/08/22  1059 09/21/23  0843   Glucose 91 87   < > 95 92 83   Sodium 139 141   < > 138 139 140   Potassium 4.5 4.5   < > 3.7 4.3 4.3   BUN 15 10   < > 11 5 L 7   Creatinine 0.75 0.7   < > 0.59 L 0.49 L 0.7   eGFR if African American >60 >60.0  --   --   --   --    eGFR if non African American >60 >60.0  --   --   --   --    Calcium 8.9 9.3   < > 9.2 9.0 9.6    < > = values in this interval not displayed.       CARDIAC BIOMARKERS:  Recent Labs   Lab 07/27/22  0954   CPK 32       COAGS:  Recent Labs   Lab 10/04/22  1044   INR 1.0       LIPIDS/LFTS:  Recent Labs   Lab 10/04/22  1044 11/08/22  1059 09/21/23  0843   AST 27 31 14   ALT 23 24 13       Cardiovascular Testing:    Echocardiogram 08/07/2020:    1. Global left ventricular systolic function is normal. The left   ventricular ejection fraction is 60%.   2. Left ventricular diastolic function is normal.   3. Trace mitral regurgitation. Trace tricuspid regurgitation.   4. The pulmonary artery appears normal.     ASSESSMENT:     Chest pain  Hypertension  Dyslipidemia  Morbid obesity  PCOS  Hepatic steatosis    PLAN:     Chest pain: Cardiovascular risk factors hypertension, dyslipidemia, morbid obesity.  Exercise stress echocardiogram for evaluation.  Hypertension: Continue current management.  Dyslipidemia: Continue current management.  Follow-up lipids.  Return to clinic 1 month.           Rai Cm MD, MPH, [facility], [facility]      05/10/2024: Stress echocardiogram not approve.  Will try to reschedule as exercise EKG and echocardiogram.

## 2024-04-24 ENCOUNTER — PATIENT MESSAGE (OUTPATIENT)
Dept: CARDIOLOGY | Facility: CLINIC | Age: 29
End: 2024-04-24
Payer: COMMERCIAL

## 2024-05-03 ENCOUNTER — TELEPHONE (OUTPATIENT)
Dept: CARDIOLOGY | Facility: CLINIC | Age: 29
End: 2024-05-03
Payer: COMMERCIAL

## 2024-05-03 NOTE — TELEPHONE ENCOUNTER
Patient's echo has not been approved by insurance and we're still awaiting a response. Spoke with Ms. Mohan and rescheduled Echo for 05/13/24 as well as her follow up appointment. Patient acknowledged. No further assistance requested.

## 2024-05-10 ENCOUNTER — TELEPHONE (OUTPATIENT)
Dept: CARDIOLOGY | Facility: CLINIC | Age: 29
End: 2024-05-10
Payer: COMMERCIAL

## 2024-05-10 NOTE — TELEPHONE ENCOUNTER
Spoke with patient to let her know that insurance denied her Stress Echo. Dr. Cm ordered a Stress EKG and a regular Echo; helped patient schedule testing and rescheduled follow up appt with Dr. Cm. Patient acknowledged, no further assistance requested.

## 2024-06-05 ENCOUNTER — HOSPITAL ENCOUNTER (OUTPATIENT)
Dept: CARDIOLOGY | Facility: HOSPITAL | Age: 29
Discharge: HOME | End: 2024-06-05
Attending: INTERNAL MEDICINE
Payer: COMMERCIAL

## 2024-06-05 DIAGNOSIS — R07.89 OTHER CHEST PAIN: ICD-10-CM

## 2024-06-05 DIAGNOSIS — I10 PRIMARY HYPERTENSION: ICD-10-CM

## 2024-06-05 DIAGNOSIS — E66.01 MORBID OBESITY: ICD-10-CM

## 2024-06-05 DIAGNOSIS — E78.5 DYSLIPIDEMIA: ICD-10-CM

## 2024-06-05 LAB
ASCENDING AORTA: 2.82 CM
AV INDEX (PROSTH): 0.83
AV MEAN GRADIENT: 5 MMHG
AV PEAK GRADIENT: 9 MMHG
AV VALVE AREA BY VELOCITY RATIO: 3.31 CM²
AV VALVE AREA: 3.48 CM²
AV VELOCITY RATIO: 0.79
CV ECHO LV RWT: 0.54 CM
CV STRESS BASE HR: 97 BPM
DIASTOLIC BLOOD PRESSURE: 66 MMHG
DOP CALC AO PEAK VEL: 1.53 M/S
DOP CALC AO VTI: 29.1 CM
DOP CALC LVOT AREA: 4.2 CM2
DOP CALC LVOT DIAMETER: 2.31 CM
DOP CALC LVOT PEAK VEL: 1.21 M/S
DOP CALC LVOT STROKE VOLUME: 101.37 CM3
DOP CALCLVOT PEAK VEL VTI: 24.2 CM
E WAVE DECELERATION TIME: 254.76 MSEC
E/A RATIO: 1.28
E/E' RATIO: 8.27 M/S
ECHO LV POSTERIOR WALL: 1.18 CM (ref 0.6–1.1)
FRACTIONAL SHORTENING: 33 % (ref 28–44)
INTERVENTRICULAR SEPTUM: 1.17 CM (ref 0.6–1.1)
IVC DIAMETER: 2.14 CM
IVRT: 99.9 MSEC
LA MAJOR: 4.9 CM
LA MINOR: 4.5 CM
LA WIDTH: 3.6 CM
LEFT ATRIUM SIZE: 3.85 CM
LEFT ATRIUM VOLUME: 55.27 CM3
LEFT INTERNAL DIMENSION IN SYSTOLE: 2.91 CM (ref 2.1–4)
LEFT VENTRICLE DIASTOLIC VOLUME: 85.15 ML
LEFT VENTRICLE SYSTOLIC VOLUME: 32.6 ML
LEFT VENTRICULAR INTERNAL DIMENSION IN DIASTOLE: 4.35 CM (ref 3.5–6)
LEFT VENTRICULAR MASS: 182.35 G
LV LATERAL E/E' RATIO: 7 M/S
LV SEPTAL E/E' RATIO: 10.11 M/S
LVOT MG: 3.2 MMHG
LVOT MV: 0.83 CM/S
MV PEAK A VEL: 0.71 M/S
MV PEAK E VEL: 0.91 M/S
MV STENOSIS PRESSURE HALF TIME: 73.88 MS
MV VALVE AREA P 1/2 METHOD: 2.98 CM2
OHS CV CPX 1 MINUTE RECOVERY HEART RATE: 155 BPM
OHS CV CPX 85 PERCENT MAX PREDICTED HEART RATE MALE: 162
OHS CV CPX ESTIMATED METS: 6.8
OHS CV CPX MAX PREDICTED HEART RATE: 191
OHS CV CPX PATIENT IS FEMALE: 1
OHS CV CPX PATIENT IS MALE: 0
OHS CV CPX PEAK DIASTOLIC BLOOD PRESSURE: 59 MMHG
OHS CV CPX PEAK HEAR RATE: 162 BPM
OHS CV CPX PEAK RATE PRESSURE PRODUCT: NORMAL
OHS CV CPX PEAK SYSTOLIC BLOOD PRESSURE: 218 MMHG
OHS CV CPX PERCENT MAX PREDICTED HEART RATE ACHIEVED: 90
OHS CV CPX RATE PRESSURE PRODUCT PRESENTING: NORMAL
OHS CV RV/LV RATIO: 0.87 CM
PULM VEIN S/D RATIO: 0.78
PV PEAK D VEL: 0.68 M/S
PV PEAK GRADIENT: 7 MMHG
PV PEAK S VEL: 0.53 M/S
PV PEAK VELOCITY: 1.32 M/S
RA MAJOR: 4.82 CM
RA PRESSURE ESTIMATED: 3 MMHG
RA WIDTH: 3.1 CM
RIGHT VENTRICULAR END-DIASTOLIC DIMENSION: 3.78 CM
RV TISSUE DOPPLER FREE WALL SYSTOLIC VELOCITY 1 (APICAL 4 CHAMBER VIEW): 12.3 CM/S
SINUS: 2.9 CM
STJ: 2.41 CM
STRESS ECHO POST EXERCISE DUR MIN: 5 MINUTES
STRESS ECHO POST EXERCISE DUR SEC: 59 SECONDS
SYSTOLIC BLOOD PRESSURE: 104 MMHG
TDI LATERAL: 0.13 M/S
TDI SEPTAL: 0.09 M/S
TDI: 0.11 M/S
TRICUSPID ANNULAR PLANE SYSTOLIC EXCURSION: 1.98 CM

## 2024-06-05 PROCEDURE — 93017 CV STRESS TEST TRACING ONLY: CPT

## 2024-06-05 PROCEDURE — 93306 TTE W/DOPPLER COMPLETE: CPT | Mod: 26,,, | Performed by: INTERNAL MEDICINE

## 2024-06-05 PROCEDURE — 93018 CV STRESS TEST I&R ONLY: CPT | Mod: ,,, | Performed by: INTERNAL MEDICINE

## 2024-06-05 PROCEDURE — 93306 TTE W/DOPPLER COMPLETE: CPT

## 2024-06-05 PROCEDURE — 93016 CV STRESS TEST SUPVJ ONLY: CPT | Mod: ,,, | Performed by: INTERNAL MEDICINE

## 2024-06-14 ENCOUNTER — OFFICE VISIT (OUTPATIENT)
Dept: CARDIOLOGY | Facility: CLINIC | Age: 29
End: 2024-06-14
Payer: COMMERCIAL

## 2024-06-14 VITALS
HEART RATE: 106 BPM | RESPIRATION RATE: 15 BRPM | HEIGHT: 65 IN | SYSTOLIC BLOOD PRESSURE: 110 MMHG | DIASTOLIC BLOOD PRESSURE: 80 MMHG | WEIGHT: 267 LBS | OXYGEN SATURATION: 98 % | BODY MASS INDEX: 44.48 KG/M2

## 2024-06-14 DIAGNOSIS — E28.2 PCOS (POLYCYSTIC OVARIAN SYNDROME): ICD-10-CM

## 2024-06-14 DIAGNOSIS — R07.89 OTHER CHEST PAIN: Primary | ICD-10-CM

## 2024-06-14 DIAGNOSIS — E66.01 MORBID OBESITY: ICD-10-CM

## 2024-06-14 DIAGNOSIS — E78.5 DYSLIPIDEMIA: ICD-10-CM

## 2024-06-14 DIAGNOSIS — I10 PRIMARY HYPERTENSION: ICD-10-CM

## 2024-06-14 DIAGNOSIS — K76.0 STEATOSIS OF LIVER: ICD-10-CM

## 2024-06-14 PROCEDURE — 3074F SYST BP LT 130 MM HG: CPT | Mod: CPTII,S$GLB,, | Performed by: INTERNAL MEDICINE

## 2024-06-14 PROCEDURE — 3079F DIAST BP 80-89 MM HG: CPT | Mod: CPTII,S$GLB,, | Performed by: INTERNAL MEDICINE

## 2024-06-14 PROCEDURE — 4010F ACE/ARB THERAPY RXD/TAKEN: CPT | Mod: CPTII,S$GLB,, | Performed by: INTERNAL MEDICINE

## 2024-06-14 PROCEDURE — 99214 OFFICE O/P EST MOD 30 MIN: CPT | Mod: S$GLB,,, | Performed by: INTERNAL MEDICINE

## 2024-06-14 PROCEDURE — 1160F RVW MEDS BY RX/DR IN RCRD: CPT | Mod: CPTII,S$GLB,, | Performed by: INTERNAL MEDICINE

## 2024-06-14 PROCEDURE — 99999 PR PBB SHADOW E&M-EST. PATIENT-LVL V: CPT | Mod: PBBFAC,,, | Performed by: INTERNAL MEDICINE

## 2024-06-14 PROCEDURE — 3008F BODY MASS INDEX DOCD: CPT | Mod: CPTII,S$GLB,, | Performed by: INTERNAL MEDICINE

## 2024-06-14 PROCEDURE — 1159F MED LIST DOCD IN RCRD: CPT | Mod: CPTII,S$GLB,, | Performed by: INTERNAL MEDICINE

## 2024-06-14 NOTE — PROGRESS NOTES
CARDIOLOGY CLINIC VISIT        HISTORY OF PRESENT ILLNESS:     04/08/2024: Charlene Mohan presents for evaluation of chest discomfort.  Symptoms over the past 2 weeks.  Left sided.  Exertional.  Symptoms can last upwards of 30 minutes.  Associated shortness of breath.  History of GERD.  These symptoms are different according to the patient.  History of hypertension.  Diagnosed at age 11.  States that she underwent an evaluation for secondary causes that was negative.  Dyslipidemia on medications since the age of 14.  Denies family history of premature coronary artery disease.  Nonsmoker.  No alcohol use.  EKG shows normal sinus rhythm.  Nonspecific T-wave abnormality.    05/10/2024: Stress echocardiogram not approve.  Will try to reschedule as exercise EKG and echocardiogram.    06/14/2024:  EKG portion negative for ischemia.  Below-average exercise capacity.  She exercised for 5 minutes 59 seconds.  Functional capacity 6.8 Mets.  Hypertensive response to exercise.  Echocardiogram showed normal left ventricular systolic function with estimated ejection fraction of 55-60%.  No recurrent chest pain.  Has been dealing mostly with migraine headaches at this point.      CARDIOVASCULAR HISTORY:     None    PAST MEDICAL HISTORY:     Past Medical History:   Diagnosis Date    Dyslipidemia     Dysmetabolic syndrome X     Essential hypertension, benign     History of polycystic ovarian syndrome     Hypothyroidism     Obesity        PAST SURGICAL HISTORY:     Past Surgical History:   Procedure Laterality Date    ADENOIDECTOMY      ESOPHAGOGASTRODUODENOSCOPY N/A 8/9/2023    Procedure: EGD (ESOPHAGOGASTRODUODENOSCOPY);  Surgeon: Fadi Pederson MD;  Location: [facility];  Service: Endoscopy;  Laterality: N/A;    SPINAL CORD STIMULATOR IMPLANT      TONSILLECTOMY      WISDOM TOOTH EXTRACTION         ALLERGIES AND MEDICATION:     Review of patient's allergies indicates:   Allergen Reactions    Diclofenac     Levalbuterol hcl       Other reaction(s): Headache        Medication List            Accurate as of June 14, 2024 11:48 AM. If you have any questions, ask your nurse or doctor.                CHANGE how you take these medications      HYDROcodone-acetaminophen 5-325 mg per tablet  Commonly known as: NORCO  Take 1 tablet by mouth every 8 (eight) hours as needed for Pain.  What changed:   how much to take  when to take this            CONTINUE taking these medications      albuterol 90 mcg/actuation inhaler  Commonly known as: PROVENTIL/VENTOLIN HFA     aspirin 81 MG Chew     atorvastatin 10 MG tablet  Commonly known as: LIPITOR     BIOTIN PLUS KERATIN 10,000-100 mcg-mg Tab  Generic drug: biotin-keratin     BREO ELLIPTA 100-25 mcg/dose diskus inhaler  Generic drug: fluticasone furoate-vilanteroL     BRIVIACT 50 mg Tab  Generic drug: brivaracetam     busPIRone 30 MG Tab  Commonly known as: BUSPAR     CeleBREX 200 mg capsule  Generic drug: celecoxib     CENTRUM WOMEN Tab  Generic drug: multivitamin-iron-folic acid     EPINEPHrine 0.3 mg/0.3 mL Atin  Commonly known as: EPIPEN     Fish OiL 1,000 mg (120 mg-180 mg) Cap  Generic drug: omega 3-dha-epa-fish oil     gabapentin 300 MG capsule  Commonly known as: NEURONTIN     hydrOXYzine HCL 25 MG tablet  Commonly known as: ATARAX     L-METHYLFOLATE 15 mg Tab  Generic drug: levomefolate calcium     lansoprazole 30 MG capsule  Commonly known as: PREVACID  Take 1 capsule (30 mg total) by mouth once daily.     lisinopriL 10 MG tablet     lubiprostone 24 MCG Cap  Commonly known as: AMITIZA  Take 1 capsule (24 mcg total) by mouth 2 (two) times daily.     magnesium 200 mg Tab     metFORMIN 500 MG ER 24hr tablet  Commonly known as: GLUCOPHAGE-XR  Take 1 tablet (500 mg total) by mouth 2 (two) times daily with meals.     metoprolol succinate 50 MG 24 hr tablet  Commonly known as: TOPROL-XL     montelukast 10 mg tablet  Commonly known as: SINGULAIR     NURTEC 75 mg odt  Generic drug: rimegepant     ondansetron  8 MG tablet  Commonly known as: ZOFRAN     OXcarbazepine 300 MG Tab  Commonly known as: TRILEPTAL     pregabalin 150 MG capsule  Commonly known as: LYRICA     QULIPTA 60 mg Tab  Generic drug: atogepant     rosuvastatin 20 MG tablet  Commonly known as: CRESTOR     spironolactone 100 MG tablet  Commonly known as: ALDACTONE  Take 1 tablet (100 mg total) by mouth 2 (two) times daily.     tiZANidine 4 MG tablet  Commonly known as: ZANAFLEX     traZODone 300 MG tablet  Commonly known as: DESYREL     valACYclovir 1000 MG tablet  Commonly known as: VALTREX     * venlafaxine 150 MG Cp24  Commonly known as: EFFEXOR-XR     * venlafaxine 75 MG tablet  Commonly known as: EFFEXOR     vitamin D3-folic acid 125 mcg (5,000 unit)-1 mg Tab     XOLAIR 150 mg/mL injection  Generic drug: omalizumab     zinc gluconate 50 mg tablet           * This list has 2 medication(s) that are the same as other medications prescribed for you. Read the directions carefully, and ask your doctor or other care provider to review them with you.                  SOCIAL HISTORY:     Social History     Socioeconomic History    Marital status:    Tobacco Use    Smoking status: Never    Smokeless tobacco: Never   Substance and Sexual Activity    Alcohol use: No    Drug use: No    Sexual activity: Yes     Partners: Male     Birth control/protection: I.U.D.   Social History Narrative    Lives at home with parents, 12 yo brother. No pets. Attending college in [employer], studying Mathematics and Computer Science. Denies alcohol, tobacco, drug use. Denies being sexually active.     Social Determinants of Health     Financial Resource Strain: Medium Risk (1/24/2024)    Overall Financial Resource Strain (CARDIA)     Difficulty of Paying Living Expenses: Somewhat hard   Food Insecurity: Food Insecurity Present (1/24/2024)    Hunger Vital Sign     Worried About Running Out of Food in the Last Year: Often true     Ran Out of Food in the Last Year: Never true    Transportation Needs: No Transportation Needs (1/24/2024)    PRAPARE - Transportation     Lack of Transportation (Medical): No     Lack of Transportation (Non-Medical): No   Physical Activity: Patient Declined (1/24/2024)    Exercise Vital Sign     Days of Exercise per Week: Patient declined     Minutes of Exercise per Session: Patient declined   Stress: No Stress Concern Present (1/24/2024)    [employer] [place] [employer] - Occupational Stress Questionnaire     Feeling of Stress : Only a little   Housing Stability: High Risk (1/24/2024)    Housing Stability Vital Sign     Unable to Pay for Housing in the Last Year: Yes     Number of Places Lived in the Last Year: 2     Unstable Housing in the Last Year: No       FAMILY HISTORY:     Family History   Problem Relation Name Age of Onset    Hypertension Paternal Grandfather      Diabetes Paternal Grandfather      Colon cancer Paternal Grandmother      Hypertension Paternal Grandmother      Diabetes Paternal Grandmother      Diabetes Maternal Grandmother      Hypertension Father      Hyperlipidemia Father      Thyroid disease Mother      Breast cancer Neg Hx      Ovarian cancer Neg Hx         REVIEW OF SYSTEMS:   Review of Systems   Constitutional:  Negative for chills, diaphoresis, fever, malaise/fatigue and weight loss.   HENT:  Negative for congestion, hearing loss, sinus pain, sore throat and tinnitus.    Eyes:  Negative for blurred vision, double vision, photophobia and pain.   Respiratory:  Negative for cough, hemoptysis, sputum production, shortness of breath, wheezing and stridor.    Cardiovascular:  Negative for chest pain, palpitations, orthopnea, claudication, leg swelling and PND.   Gastrointestinal:  Negative for abdominal pain, blood in stool, heartburn, melena, nausea and vomiting.   Musculoskeletal:  Negative for back pain, falls, joint pain, myalgias and neck pain.   Neurological:  Positive for headaches. Negative for dizziness, tingling,  "tremors, sensory change, speech change, focal weakness, seizures, loss of consciousness and weakness.   Endo/Heme/Allergies:  Does not bruise/bleed easily.   Psychiatric/Behavioral:  Negative for depression, memory loss and substance abuse. The patient is not nervous/anxious.        PHYSICAL EXAM:     Vitals:    06/14/24 1134   BP: 110/80   Pulse: 106   Resp: 15      Body mass index is 44.43 kg/m².  Weight: 121.1 kg (266 lb 15.6 oz)   Height: 5' 5" (165.1 cm)     Physical Exam  Vitals reviewed.   Constitutional:       General: She is not in acute distress.     Appearance: She is well-developed. She is obese. She is not diaphoretic.   HENT:      Head: Normocephalic.   Neck:      Vascular: No carotid bruit or JVD.   Cardiovascular:      Rate and Rhythm: Normal rate and regular rhythm.      Pulses: Normal pulses.      Heart sounds: Normal heart sounds.   Pulmonary:      Effort: Pulmonary effort is normal.      Breath sounds: Normal breath sounds.   Abdominal:      General: Bowel sounds are normal.      Palpations: Abdomen is soft.      Tenderness: There is no abdominal tenderness.   Skin:     General: Skin is warm and dry.   Neurological:      Mental Status: She is alert and oriented to person, place, and time.   Psychiatric:         Speech: Speech normal.         Behavior: Behavior normal.         Thought Content: Thought content normal.       DATA:   EKG: (personally reviewed tracing)  04/08/2024-normal sinus rhythm, nonspecific T-wave abnormality  Laboratory:  CBC:  Recent Labs   Lab 10/04/22  1044 11/08/22  1058 09/21/23  0843   WBC 10.20 9.50 13.01 H   Hemoglobin 12.4 12.1 13.0   Hematocrit 36.2 L 35.0 L 39.9   Platelets 331 308 348       CHEMISTRIES:  Recent Labs   Lab 06/23/21  1955 07/27/22  0954 09/04/22  1921 10/04/22  1044 11/08/22  1059 09/21/23  0843   Glucose 91 87   < > 95 92 83   Sodium 139 141   < > 138 139 140   Potassium 4.5 4.5   < > 3.7 4.3 4.3   BUN 15 10   < > 11 5 L 7   Creatinine 0.75 0.7   < > " 0.59 L 0.49 L 0.7   eGFR if African American >60 >60.0  --   --   --   --    eGFR if non African American >60 >60.0  --   --   --   --    Calcium 8.9 9.3   < > 9.2 9.0 9.6    < > = values in this interval not displayed.       CARDIAC BIOMARKERS:  Recent Labs   Lab 07/27/22  0954   CPK 32       COAGS:  Recent Labs   Lab 10/04/22  1044   INR 1.0       LIPIDS/LFTS:  Recent Labs   Lab 10/04/22  1044 11/08/22  1059 09/21/23  0843   AST 27 31 14   ALT 23 24 13       Cardiovascular Testing:    Exercise EKG 06/05/2024:      The ECG portion of the study is negative for ischemia.    The patient reported chest pain during the stress test.    There were no arrhythmias during stress.    The exercise capacity was below average.    The patient exercised for 5 minutes 59 seconds on a Leonel protocol, corresponding to a functional capacity of 6.8METS, achieving a peak heart rate of 162 bpm, which is 90% of the age predicted maximum heart rate. The patient experienced exercise-limiting angina during the test. Their exercise capacity was below average.    Echocardiogram 06/05/2024:      Left Ventricle: The left ventricle is normal in size. There is normal systolic function with a visually estimated ejection fraction of 55 - 60%. There is normal diastolic function.    Right Ventricle: Normal right ventricular cavity size. Systolic function is normal.    IVC/SVC: Normal venous pressure at 3 mmHg.    Echocardiogram 08/07/2020:    1. Global left ventricular systolic function is normal. The left   ventricular ejection fraction is 60%.   2. Left ventricular diastolic function is normal.   3. Trace mitral regurgitation. Trace tricuspid regurgitation.   4. The pulmonary artery appears normal.     ASSESSMENT:     Chest pain  Hypertension: Controlled  Dyslipidemia  Morbid obesity  PCOS  Hepatic steatosis    PLAN:     Chest pain:  Stress EKG negative for ischemia.  Echocardiogram shows normal function.  Hypertension: Continue current  management.  Dyslipidemia: Continue current management.  Follow-up lipids.  Return to clinic 6 months.           Rai Cm MD, MPH, FACC, [facility]

## 2024-08-06 ENCOUNTER — HOSPITAL ENCOUNTER (OUTPATIENT)
Dept: RADIOLOGY | Facility: HOSPITAL | Age: 29
Discharge: HOME | End: 2024-08-06
Attending: PHYSICIAN ASSISTANT
Payer: COMMERCIAL

## 2024-08-06 ENCOUNTER — OFFICE VISIT (OUTPATIENT)
Dept: SPORTS MEDICINE | Facility: CLINIC | Age: 29
End: 2024-08-06
Payer: COMMERCIAL

## 2024-08-06 VITALS
HEART RATE: 81 BPM | DIASTOLIC BLOOD PRESSURE: 72 MMHG | BODY MASS INDEX: 42.66 KG/M2 | HEIGHT: 65 IN | WEIGHT: 256.06 LBS | SYSTOLIC BLOOD PRESSURE: 105 MMHG

## 2024-08-06 DIAGNOSIS — M22.41 CHONDROMALACIA OF RIGHT PATELLA: ICD-10-CM

## 2024-08-06 DIAGNOSIS — M25.561 CHRONIC PAIN OF RIGHT KNEE: Primary | ICD-10-CM

## 2024-08-06 DIAGNOSIS — M25.569 KNEE PAIN: Primary | ICD-10-CM

## 2024-08-06 DIAGNOSIS — R52 PAIN: ICD-10-CM

## 2024-08-06 DIAGNOSIS — G89.29 CHRONIC PAIN OF RIGHT KNEE: Primary | ICD-10-CM

## 2024-08-06 PROCEDURE — 73564 X-RAY EXAM KNEE 4 OR MORE: CPT | Mod: 26,50,, | Performed by: RADIOLOGY

## 2024-08-06 PROCEDURE — 1160F RVW MEDS BY RX/DR IN RCRD: CPT | Mod: CPTII,S$GLB,, | Performed by: PHYSICIAN ASSISTANT

## 2024-08-06 PROCEDURE — 3078F DIAST BP <80 MM HG: CPT | Mod: CPTII,S$GLB,, | Performed by: PHYSICIAN ASSISTANT

## 2024-08-06 PROCEDURE — 99204 OFFICE O/P NEW MOD 45 MIN: CPT | Mod: S$GLB,,, | Performed by: PHYSICIAN ASSISTANT

## 2024-08-06 PROCEDURE — 3008F BODY MASS INDEX DOCD: CPT | Mod: CPTII,S$GLB,, | Performed by: PHYSICIAN ASSISTANT

## 2024-08-06 PROCEDURE — 4010F ACE/ARB THERAPY RXD/TAKEN: CPT | Mod: CPTII,S$GLB,, | Performed by: PHYSICIAN ASSISTANT

## 2024-08-06 PROCEDURE — 1159F MED LIST DOCD IN RCRD: CPT | Mod: CPTII,S$GLB,, | Performed by: PHYSICIAN ASSISTANT

## 2024-08-06 PROCEDURE — 73564 X-RAY EXAM KNEE 4 OR MORE: CPT | Mod: TC,50

## 2024-08-06 PROCEDURE — 3074F SYST BP LT 130 MM HG: CPT | Mod: CPTII,S$GLB,, | Performed by: PHYSICIAN ASSISTANT

## 2024-08-06 PROCEDURE — 99999 PR PBB SHADOW E&M-EST. PATIENT-LVL V: CPT | Mod: PBBFAC,,, | Performed by: PHYSICIAN ASSISTANT

## 2024-08-12 ENCOUNTER — HOSPITAL ENCOUNTER (OUTPATIENT)
Dept: RADIOLOGY | Facility: HOSPITAL | Age: 29
Discharge: HOME | End: 2024-08-12
Attending: PHYSICIAN ASSISTANT
Payer: COMMERCIAL

## 2024-08-12 DIAGNOSIS — M22.41 CHONDROMALACIA OF RIGHT PATELLA: ICD-10-CM

## 2024-08-12 DIAGNOSIS — G89.29 CHRONIC PAIN OF RIGHT KNEE: ICD-10-CM

## 2024-08-12 DIAGNOSIS — M25.561 CHRONIC PAIN OF RIGHT KNEE: ICD-10-CM

## 2024-08-12 PROCEDURE — 73721 MRI JNT OF LWR EXTRE W/O DYE: CPT | Mod: TC,RT

## 2024-08-12 PROCEDURE — 73721 MRI JNT OF LWR EXTRE W/O DYE: CPT | Mod: 26,RT,, | Performed by: RADIOLOGY

## 2024-08-14 ENCOUNTER — OFFICE VISIT (OUTPATIENT)
Dept: SPORTS MEDICINE | Facility: CLINIC | Age: 29
End: 2024-08-14
Payer: COMMERCIAL

## 2024-08-14 ENCOUNTER — CLINICAL SUPPORT (OUTPATIENT)
Dept: REHABILITATION | Facility: HOSPITAL | Age: 29
End: 2024-08-14
Payer: COMMERCIAL

## 2024-08-14 DIAGNOSIS — M22.41 CHONDROMALACIA OF RIGHT PATELLA: ICD-10-CM

## 2024-08-14 DIAGNOSIS — M25.561 CHRONIC PAIN OF RIGHT KNEE: ICD-10-CM

## 2024-08-14 DIAGNOSIS — G89.29 CHRONIC PAIN OF RIGHT KNEE: Primary | ICD-10-CM

## 2024-08-14 DIAGNOSIS — M23.51 CHRONIC INSTABILITY OF RIGHT KNEE: ICD-10-CM

## 2024-08-14 DIAGNOSIS — M25.561 CHRONIC PAIN OF RIGHT KNEE: Primary | ICD-10-CM

## 2024-08-14 DIAGNOSIS — M25.661 DECREASED RANGE OF MOTION OF RIGHT KNEE: ICD-10-CM

## 2024-08-14 DIAGNOSIS — R29.898 RIGHT LEG WEAKNESS: Primary | ICD-10-CM

## 2024-08-14 DIAGNOSIS — G89.29 CHRONIC PAIN OF RIGHT KNEE: ICD-10-CM

## 2024-08-14 PROCEDURE — 97140 MANUAL THERAPY 1/> REGIONS: CPT

## 2024-08-14 PROCEDURE — 97161 PT EVAL LOW COMPLEX 20 MIN: CPT

## 2024-08-14 NOTE — PLAN OF CARE
[facility]   Physical Therapy Initial Evaluation      Date: 8/14/2024   Name: [facility] [place]  Clinic Number: [id number]    Therapy Diagnosis:   Encounter Diagnoses   Name Primary?    Chronic pain of right knee     Chondromalacia of right patella     Right leg weakness Yes    Decreased range of motion of right knee      Physician: Maximiliano Chandra*    Physician Orders: PT Eval and Treat   Medical Diagnosis from Referral:   M25.561,G89.29 (ICD-10-CM) - Chronic pain of right knee   M22.41 (ICD-10-CM) - Chondromalacia of right patella   Evaluation Date: 8/14/2024  Authorization Period Expiration: TBD  Plan of Care Expiration: 10/14/2024  Progress Note Due: 9/14/2024  Visit # / Visits authorized: 1/1   FOTO: 1/5    FOTO Initial Evaluation: 54  FOTO 2nd F/U: NA  FOTO Discharge: NA    Precautions: Standard     Time In: 710  Time Out: 745  Total Appointment Time (timed & untimed codes): 35 minutes      SUBJECTIVE     Date of onset: Years    History of current condition - Charlene reports: she has been having R knee pain for years and had PT in 2019. Patient reports she found out she had a torn meniscus and had surgery to fix it on the R knee. Patient reports she had this surgery in 2019. Patient reports she was pain free for about 6 months. Patient reports she began having inner R knee pain since then. Patient has had steroid shots in the R knee (approximately 2 shots). Patient reports these injections helped for awhile. Patient reports she had an MRI and is going to the doctor today to discuss imaging. Patient reports her knees hyperextend. Patient reports bending into a squat hurts her R knee.     Falls: None    Imaging, MRI studies: Impression:     1. Mucoid degeneration of ACL with ganglion cyst formation along the distal aspect.  Somewhat irregular morphology raises concern for superimposed partial tear.       Prior Therapy: Yes  Occupation: - desk job   Prior  Level of Function: Independent with ADLs   Current Level of Function: Independent with ADLs     Pain:  Current 0/10, worst 6/10, best 0/10   Location: right knee    Description: Sharp, achy/dull   Aggravating Factors: Squatting down and hyperextension of R knee  Easing Factors: Celebrex 2x a day since 2019    Patients goals: to strengthen the knee and reduce pain      Medical History:   Past Medical History:   Diagnosis Date    Dyslipidemia     Dysmetabolic syndrome X     Essential hypertension, benign     History of polycystic ovarian syndrome     Hypothyroidism     Obesity        Surgical History:   Charlene Mohan  has a past surgical history that includes Tonsillectomy; Adenoidectomy; [place] tooth extraction; Spinal cord stimulator implant; and Esophagogastroduodenoscopy (N/A, 8/9/2023).    Medications:   Charlene has a current medication list which includes the following prescription(s): albuterol, aspirin, atorvastatin, biotin plus keratin, breo ellipta, buspirone, celebrex, epinephrine, gabapentin, hydrocodone-acetaminophen, hydroxyzine hcl, lansoprazole, levomefolate calcium, lisinopril, lubiprostone, magnesium, metformin, metoprolol succinate, montelukast, centrum women, nurtec, xolair, omega 3-dha-epa-fish oil, ondansetron, oxcarbazepine, pregabalin, qulipta, rosuvastatin, spironolactone, tizanidine, trazodone, valacyclovir, venlafaxine, venlafaxine, vitamin d3-folic acid, and zinc gluconate, and the following Facility-Administered Medications: levonorgestrel.    Allergies:   Review of patient's allergies indicates:   Allergen Reactions    Diclofenac     Levalbuterol hcl      Other reaction(s): Headache          OBJECTIVE       L/E Strength w/ MicroFET Muscle Barb Dynamometer Right Left Pain/Dysfunction with Movement   (approx 4 sec hold w/ max contraction)   Hip Flexion 13.6 kg  17.5 kg     Hip Abduction 12.3 kg  13.4 kg     Quadriceps 15.8 kg  ! 16.3 kg     Hamstrings 11.8 kg  11.4 kg              Knee  Right Right Left Left Pain/Dysfunction with Movement    AROM PROM AROM PROM    Flexion 120  125 ! 125 130    Extension 0 5 deg hyper 0 5 deg hyper        Valgus/Varus Stress Test (0 and 30 degree): -     Anterior Drawer: Negative     Stephany Test: + on R     Thessaly Test: + on the R     Observation: Painful squatting. Toes angled outward when walking/ squatting       FOTO Knee Survey    Therapist reviewed FOTO scores for Charlene Mohan on 8/14/2024.   FOTO documents entered into [employer] - see Media section.    Intake Score: 54         TREATMENT     Total Treatment time (time-based codes) separate from Evaluation: 10 minutes      Charlene received the treatments listed below:          manual therapy techniques: were applied to the: R knee for 10 minutes, including:    R knee kinesiotaping for patellar tendon offloading         NEXT SESSION: Quad strengthening, hip strengthening, functional movements to help with squatting          PATIENT EDUCATION AND HOME EXERCISES     Education provided:   - Purpose of PT      ASSESSMENT     Charlene is a 29 y.o. female referred to outpatient Physical Therapy with a medical diagnosis of   M25.561,G89.29 (ICD-10-CM) - Chronic pain of right knee   M22.41 (ICD-10-CM) - Chondromalacia of right patella     Patient presents with a chronic history of R knee pain. Patient presents with painful knee flexion on the R. Patient also has weakness on the R leg, primarily in the hips and quads. Patients symptoms present similarly to patellofemoral pain syndrome on R knee or R knee meniscus pathology. Patient was taped today to help with offloading the patellar tendon. Patient had less pain when leaving today. Will work on strengthening R knee and hip in future sessions.     Patient prognosis is Good.   Patient will benefit from skilled outpatient Physical Therapy to address the deficits stated above and in the chart below, provide patient /family education, and to maximize patientt's level of  independence.     Plan of care discussed with patient: Yes  Patient's spiritual, cultural and educational needs considered and patient is agreeable to the plan of care and goals as stated below:     Anticipated Barriers for therapy: Chronic knee pain     Medical Necessity is demonstrated by the following  History  Co-morbidities and personal factors that may impact the plan of care Co-morbidities:   high BMI and HTN    Personal Factors:   no deficits     moderate   Examination  Body Structures and Functions, activity limitations and participation restrictions that may impact the plan of care Body Regions:   lower extremities    Body Systems:    ROM  strength    Participation Restrictions:   None    Activity limitations:   Learning and applying knowledge  no deficits    General Tasks and Commands  no deficits    Communication  no deficits    Mobility  no deficits    Self care  no deficits    Domestic Life  no deficits    Interactions/Relationships  no deficits    Life Areas  no deficits    Community and Social Life  no deficits         moderate   Clinical Presentation stable and uncomplicated low   Decision Making/ Complexity Score: low     Goals:    Short Term Goals: in 4 weeks    1. Pt will be independent with HEP supplement PT in improving functional mobility.  2. Pt will improve R LE strength to at least 75% of L LE strength as measured via MicroFet handheld dynamometer in order to improve functional mobility  3. Pt will improve R knee AROM to at least 0-0-125 in order to improve gait    Long Term Goals: in 8 weeks    1. Pt will be independent with updated HEP supplement PT in improving functional mobility.  2. Pt will improve R LE strength to at least 95% of L LE strength as measured via MicroFet handheld dynamometer in order to improve functional mobility  3. Pt will improve R knee AROM to at least 0-0-130 in order to improve gait and ability to perform ADLs  4. Pt will improve FOTO knee survey score to >/= 70  in order to demo improved functional mobility      PLAN   Plan of care Certification: 8/14/2024 to 10/14/2024.    Outpatient Physical Therapy 2 times weekly for 8 weeks to include the following interventions: Gait Training, Manual Therapy, Moist Heat/ Ice, Neuromuscular Re-ed, Patient Education, Self Care, Therapeutic Activities, Therapeutic Exercise, and FDN.     Shashi Miranda, PT      I CERTIFY THE NEED FOR THESE SERVICES FURNISHED UNDER THIS PLAN OF TREATMENT AND WHILE UNDER MY CARE   Physician's comments:     Physician's Signature: ___________________________________________________

## 2024-08-15 NOTE — PROGRESS NOTES
Telemedicine/Virtual Visit Documentation:     The patient location is: home    The chief complaint leading to consultation is: see HPI from 8/6/2024, MRI R knee results review    Interval Hx: Patient says she has started PT and was doing ok, but then yesterday she planted her foot and her knee hyperextended again. She endorses pain in the knee now that is more significant that her chronic baseline pain. Did not hear a pop.    VISIT TYPE X   Virtual visit with synchronous audio and video    Telephone E/M service x     Total time spent with patient: see X tara on chart below.   More than half of the time was spent counseling or coordinating care including prognosis, differential diagnosis, risks and benefits of treatment, instructions, compliance risk reductions     EST MINUTES X   [id number] 5    [id number] 10    [id number] 15    63495 25    60794 40    NEW     94731 10    80965 20    26167 30    22497 45    50115 60    PHONE      5-10    36953 11-20 x   99443 21-30      MRI of RIGHT knee:   FINDINGS:  Menisci:  There is no tear of the medial or lateral meniscus.     Ligaments:  There is mucoid degeneration of the ACL with ganglion cyst formation along the distal aspect.  Somewhat irregular morphology raises concern for superimposed partial tear.  PCL, MCL, and LCL complex are intact.     Tendons:  Extensor mechanism is maintained.     Cartilage:     Patellofemoral: Articular cartilage is maintained.     Medial tibiofemoral: Articular cartilage is maintained.     Lateral tibiofemoral: Articular cartilage is maintained.     Bone: No fracture or marrow replacing process.     Miscellaneous: There is no joint effusion.    Assessment:  Chronic Right knee pain  Knee instability    Plan:  Called and reviewed MRI results with Charlene harris. She feels that in the setting of this recent hyper extension incident that her knee pain is worsened. She had been doing ok in PT prior. Her MRI does demonstrate some signal over the ACL indicative  of possible partial tear. I would like to see her back in person to re-examine her after this recent reinjury.

## 2024-08-19 ENCOUNTER — HOSPITAL ENCOUNTER (OUTPATIENT)
Dept: RADIOLOGY | Facility: HOSPITAL | Age: 29
Discharge: HOME | End: 2024-08-19
Attending: PHYSICIAN ASSISTANT
Payer: COMMERCIAL

## 2024-08-19 ENCOUNTER — OFFICE VISIT (OUTPATIENT)
Dept: SPORTS MEDICINE | Facility: CLINIC | Age: 29
End: 2024-08-19
Payer: COMMERCIAL

## 2024-08-19 VITALS
WEIGHT: 250.69 LBS | DIASTOLIC BLOOD PRESSURE: 74 MMHG | HEIGHT: 65 IN | SYSTOLIC BLOOD PRESSURE: 107 MMHG | BODY MASS INDEX: 41.77 KG/M2

## 2024-08-19 DIAGNOSIS — M23.51 CHRONIC INSTABILITY OF RIGHT KNEE: Primary | ICD-10-CM

## 2024-08-19 DIAGNOSIS — M23.51 CHRONIC INSTABILITY OF RIGHT KNEE: ICD-10-CM

## 2024-08-19 PROCEDURE — 99999 PR PBB SHADOW E&M-EST. PATIENT-LVL V: CPT | Mod: PBBFAC,,, | Performed by: PHYSICIAN ASSISTANT

## 2024-08-19 PROCEDURE — 1160F RVW MEDS BY RX/DR IN RCRD: CPT | Mod: CPTII,S$GLB,, | Performed by: PHYSICIAN ASSISTANT

## 2024-08-19 PROCEDURE — 4010F ACE/ARB THERAPY RXD/TAKEN: CPT | Mod: CPTII,S$GLB,, | Performed by: PHYSICIAN ASSISTANT

## 2024-08-19 PROCEDURE — 99499 UNLISTED E&M SERVICE: CPT | Mod: S$GLB,,, | Performed by: PHYSICIAN ASSISTANT

## 2024-08-19 PROCEDURE — 1159F MED LIST DOCD IN RCRD: CPT | Mod: CPTII,S$GLB,, | Performed by: PHYSICIAN ASSISTANT

## 2024-08-19 PROCEDURE — 3008F BODY MASS INDEX DOCD: CPT | Mod: CPTII,S$GLB,, | Performed by: PHYSICIAN ASSISTANT

## 2024-08-19 PROCEDURE — 3078F DIAST BP <80 MM HG: CPT | Mod: CPTII,S$GLB,, | Performed by: PHYSICIAN ASSISTANT

## 2024-08-19 PROCEDURE — 73560 X-RAY EXAM OF KNEE 1 OR 2: CPT | Mod: 26,RT,, | Performed by: RADIOLOGY

## 2024-08-19 PROCEDURE — 3074F SYST BP LT 130 MM HG: CPT | Mod: CPTII,S$GLB,, | Performed by: PHYSICIAN ASSISTANT

## 2024-08-19 PROCEDURE — 73560 X-RAY EXAM OF KNEE 1 OR 2: CPT | Mod: TC,RT

## 2024-08-19 NOTE — PROGRESS NOTES
CC: Right knee pain    Patient presents for follow up/repeat examination of the right knee after MRI results demonstrated mucoid degeneration of her ACL. When I called to review results with her she reported a hyperextension incident the Saturday after her MRI. She says she heard a pop and reports a new found pain in her knee. Different from the original pain she presented with. She does endorse instability. She is in PT from my prior referral. Endorses swelling and pain after recent incident. Taking Celebrex. Here today for reexamination.     Prior Hx:  29 y.o. Female who presents as a new patient to me. She works at a desk/sedentary job. Complaint is right knee pain x years. She hyperextended her knee on an elipitical and sustained a meniscus tear. Underwent meniscectomy in 2019 with [facility]. She says the knee has bothered her ever since. Pain localizes medially.  Endorses swelling/ effusions. No prominent mechanical symptoms. Endorses instability. Worse with increase weightbearing/ambulation. Better with rest. Treatment thus far has included rest, activity modifications, oral medications and CSI. Here today to discuss diagnosis and treatment options.  Previous ortho has done multiple CSI on the knee which she says normally lasts her about 6 months. Last CSI 11/2023. Has also done formal PT in the past. Last time was in 2021.    PMHx notable for HTN, HLD.   Negative for tobacco.   Negative for diabetes.     Pain Score:   6    REVIEW OF SYSTEMS:   Constitution: Negative. Negative for chills, fever and night sweats.    Hematologic/Lymphatic: Negative for bleeding problem. Does not bruise/bleed easily.   Skin: Negative for dry skin, itching and rash.   Musculoskeletal: Negative for falls. Positive for right knee pain and muscle weakness.     All other review of symptoms were reviewed and found to be noncontributory.     PAST MEDICAL HISTORY:   Past Medical History:   Diagnosis Date    Dyslipidemia      Dysmetabolic syndrome X     Essential hypertension, benign     History of polycystic ovarian syndrome     Hypothyroidism     Obesity        PAST SURGICAL HISTORY:   Past Surgical History:   Procedure Laterality Date    ADENOIDECTOMY      ESOPHAGOGASTRODUODENOSCOPY N/A 8/9/2023    Procedure: EGD (ESOPHAGOGASTRODUODENOSCOPY);  Surgeon: Fadi Pederson MD;  Location: [facility];  Service: Endoscopy;  Laterality: N/A;    SPINAL CORD STIMULATOR IMPLANT      TONSILLECTOMY      WISDOM TOOTH EXTRACTION         FAMILY HISTORY:   Family History   Problem Relation Name Age of Onset    Hypertension Paternal Grandfather      Diabetes Paternal Grandfather      Colon cancer Paternal Grandmother      Hypertension Paternal Grandmother      Diabetes Paternal Grandmother      Diabetes Maternal Grandmother      Hypertension Father      Hyperlipidemia Father      Thyroid disease Mother      Breast cancer Neg Hx      Ovarian cancer Neg Hx         SOCIAL HISTORY:   Social History     Socioeconomic History    Marital status:    Tobacco Use    Smoking status: Never    Smokeless tobacco: Never   Substance and Sexual Activity    Alcohol use: No    Drug use: No    Sexual activity: Yes     Partners: Male     Birth control/protection: I.U.D.   Social History Narrative    Lives at home with parents, 14 yo brother. No pets. Attending college in [employer], studying Mathematics and Computer Science. Denies alcohol, tobacco, drug use. Denies being sexually active.     Social Determinants of Health     Financial Resource Strain: Medium Risk (1/24/2024)    Overall Financial Resource Strain (CARDIA)     Difficulty of Paying Living Expenses: Somewhat hard   Food Insecurity: Food Insecurity Present (1/24/2024)    Hunger Vital Sign     Worried About Running Out of Food in the Last Year: Often true     Ran Out of Food in the Last Year: Never true   Transportation Needs: No Transportation Needs (1/24/2024)    PRAPARE - Transportation     Lack of  Transportation (Medical): No     Lack of Transportation (Non-Medical): No   Physical Activity: Patient Declined (1/24/2024)    Exercise Vital Sign     Days of Exercise per Week: Patient declined     Minutes of Exercise per Session: Patient declined   Stress: No Stress Concern Present (1/24/2024)    [employer] - Occupational Stress Questionnaire     Feeling of Stress : Only a little   Housing Stability: High Risk (1/24/2024)    Housing Stability Vital Sign     Unable to Pay for Housing in the Last Year: Yes     Number of Places Lived in the Last Year: 2     Unstable Housing in the Last Year: No       MEDICATIONS:     Current Outpatient Medications:     albuterol (PROVENTIL/VENTOLIN HFA) 90 mcg/actuation inhaler, INHALE 2 PUFFS BY MOUTH EVERY 4 TO 6 HOURS AS NEEDED FOR SHORTNESS OF BREATH OR WHEEZING, Disp: , Rfl:     aspirin 81 MG Chew, Take 81 mg by mouth once daily., Disp: , Rfl:     atorvastatin (LIPITOR) 10 MG tablet, Take 10 mg by mouth once daily., Disp: , Rfl:     biotin-keratin (BIOTIN PLUS KERATIN) 10,000-100 mcg-mg Tab, Keratin, Disp: , Rfl:     BREO ELLIPTA 100-25 mcg/dose diskus inhaler, Inhale 1 puff into the lungs once daily., Disp: , Rfl:     busPIRone (BUSPAR) 30 MG Tab, Take 30 mg by mouth 2 (two) times daily., Disp: , Rfl:     CELEBREX 200 mg capsule, Take 100 mg by mouth 2 (two) times daily., Disp: , Rfl:     EPINEPHrine (EPIPEN) 0.3 mg/0.3 mL AtIn, as directed Injection prn anaphylaxis, Disp: , Rfl:     gabapentin (NEURONTIN) 300 MG capsule, Take 300 mg by mouth 2 (two) times daily., Disp: , Rfl:     HYDROcodone-acetaminophen (NORCO) 5-325 mg per tablet, Take 1 tablet by mouth every 8 (eight) hours as needed for Pain. (Patient taking differently: Take 7.5-325 tablets by mouth once daily.), Disp: 15 tablet, Rfl: 0    hydrOXYzine HCL (ATARAX) 25 MG tablet, Take 25 mg by mouth every 6 (six) hours as needed., Disp: , Rfl:     lansoprazole (PREVACID) 30 MG capsule, Take 1  capsule (30 mg total) by mouth once daily., Disp: 30 capsule, Rfl: 11    levomefolate calcium (L-METHYLFOLATE) 15 mg Tab, L-Methylfolate, Disp: , Rfl:     lisinopriL 10 MG tablet, Take 10 mg by mouth once daily., Disp: , Rfl:     lubiprostone (AMITIZA) 24 MCG Cap, Take 1 capsule (24 mcg total) by mouth 2 (two) times daily., Disp: 60 capsule, Rfl: 11    magnesium 200 mg Tab, 400 mg daily, Disp: , Rfl:     metFORMIN (GLUCOPHAGE-XR) 500 MG ER 24hr tablet, Take 1 tablet (500 mg total) by mouth 2 (two) times daily with meals., Disp: 180 tablet, Rfl: 3    metoprolol succinate (TOPROL-XL) 50 MG 24 hr tablet, Take 50 mg by mouth once daily., Disp: , Rfl:     montelukast (SINGULAIR) 10 mg tablet, Take 10 mg by mouth once daily., Disp: , Rfl:     multivitamin-iron-folic acid (CENTRUM WOMEN) Tab, Centrum Women, Disp: , Rfl:     NURTEC 75 mg odt, Take 75 mg by mouth every other day., Disp: , Rfl:     omalizumab (XOLAIR) 150 mg/mL injection, , Disp: , Rfl:     omega 3-dha-epa-fish oil (FISH OIL) 1,000 mg (120 mg-180 mg) Cap, Take 1,000 mg by mouth 2 (two) times a day., Disp: , Rfl:     ondansetron (ZOFRAN) 8 MG tablet, Take 8 mg by mouth 2 (two) times daily as needed., Disp: , Rfl:     OXcarbazepine (TRILEPTAL) 300 MG Tab, Take 300 mg by mouth 2 (two) times daily., Disp: , Rfl:     pregabalin (LYRICA) 150 MG capsule, Take 150 mg by mouth 3 (three) times daily., Disp: , Rfl:     QULIPTA 60 mg Tab, Take 1 tablet by mouth., Disp: , Rfl:     rosuvastatin (CRESTOR) 20 MG tablet, Take 20 mg by mouth., Disp: , Rfl:     spironolactone (ALDACTONE) 100 MG tablet, Take 1 tablet (100 mg total) by mouth 2 (two) times daily., Disp: 90 tablet, Rfl: 3    tiZANidine (ZANAFLEX) 4 MG tablet, , Disp: , Rfl:     traZODone (DESYREL) 300 MG tablet, Take 300 mg by mouth once daily., Disp: , Rfl:     valACYclovir (VALTREX) 1000 MG tablet, Take 1,000 mg by mouth., Disp: , Rfl:     venlafaxine (EFFEXOR) 75 MG tablet, , Disp: , Rfl:     venlafaxine  "(EFFEXOR-XR) 150 MG Cp24, Take 150 mg by mouth once daily. 75mg with the 150 225mg, Disp: , Rfl:     vitamin D3-folic acid 125 mcg (5,000 unit)-1 mg Tab, Vitamin D, Disp: , Rfl:     zinc gluconate 50 mg tablet, Take 50 mg by mouth once daily., Disp: , Rfl:     Current Facility-Administered Medications:     levonorgestreL (LILETTA) 20.4 mcg/24 hrs (8 yrs) 52 mg IUD 18.6 mcg, 18.6 mcg, Intrauterine, , Charley Merritt MD, 18.6 mcg at 11/29/23 1415    ALLERGIES:   Review of patient's allergies indicates:   Allergen Reactions    Diclofenac     Levalbuterol hcl      Other reaction(s): Headache        PHYSICAL EXAMINATION:  /74   Ht 5' 5" (1.651 m)   Wt 113.7 kg (250 lb 10.6 oz)   BMI 41.71 kg/m²   General: Well-developed well-nourished 29 y.o. femalein no acute distress   Cardiovascular: Regular rhythm by palpation of distal pulse, normal color and temperature, no concerning varicosities on symptomatic side   Lungs: No labored breathing or wheezing appreciated   Neuro: Alert and oriented ×3   Psychiatric: well oriented to person, place and time, demonstrates normal mood and affect   Skin: No rashes, lesions or ulcers, normal temperature, turgor, and texture on involved extremity    Ortho/SPM Exam  Examination of the right knee demonstrates intact extensor mechanism. No effusion or prepatellar swelling. Lateral patellar tracking. No patellar apprehension. Normal patellar mobility. Full extension, hypers about 5 degrees. Flexion to 125. No pain with forced flexion or extension. Prominent tenderness along the medial and lateral joint line/tibial plateau. Negative Stephany's. Negative Lachman. Stable to varus/valgus stress testing at 0 and 30 deg. Negative anterior and posterior drawer. Ligamentously stable. Quad weakness 4/5.    IMAGING:  X-rays including standing, weight bearing AP and flexion bilateral knees, RIGHT knee lateral and sunrise views ordered and images reviewed by me show:    FINDINGS:  Joint spaces " maintained.  Bony structures are intact.  No joint effusion is seen on either side.    MRI of RIGHT knee:   FINDINGS:  Menisci:  There is no tear of the medial or lateral meniscus.     Ligaments:  There is mucoid degeneration of the ACL with ganglion cyst formation along the distal aspect.  Somewhat irregular morphology raises concern for superimposed partial tear.  PCL, MCL, and LCL complex are intact.     Tendons:  Extensor mechanism is maintained.     Cartilage:     Patellofemoral: Articular cartilage is maintained.     Medial tibiofemoral: Articular cartilage is maintained.     Lateral tibiofemoral: Articular cartilage is maintained.     Bone: No fracture or marrow replacing process.     Miscellaneous: There is no joint effusion.    ASSESSMENT:      ICD-10-CM ICD-9-CM   1. Chronic instability of right knee  M23.51 718.86       PLAN:     -Findings and treatment options were discussed with the patient. After initial visit, original MRI was ordered to assess patellofemoral cartilage as well as meniscal pathology. At that time she was reporting more of a chronic patellofemoral pain. She does have some maltracking and likely chondromalacia patellae. In the interm she had a hyperextension incident and has had increased pain and instability since then. Repeat imaging done today demonstrates no acute fracture. On exam, her ACL does have a firm end point. Likely acute hyper extension injury caused some bone bruising, on top of her chronic patellar pain. I would like for her to see Dr. Rivera for repeat examination. In the meantime I will have her continue PT and Celebrex.  Will place her in a short runner brace today.   2. Ice compress to the affected area 2-3x a day for 15-20 minutes as needed for pain management.  3. Continue physical therapy for patellofemoral protocol, quad strengthening.  4. Celebrex 200 mg twice daily PRN for pain management.  5. RTC to see Dr. Rivera for evaluation.    All of the patient's  questions were answered and the patient will contact us if they have any questions or concerns in the interim.

## 2024-08-26 NOTE — PROGRESS NOTES
CC: Right knee pain    29 y.o. Female who presents as a new patient to me. She works at a desk/sedentary job.  She has a longstanding history of pain and problems with her right knee.  She describes subjective instability with intermittent hyperextension episodes.  Once these occur she is on crutches for a few days and she has some swelling.  She feels that she can not trust the knee.  It feels unstable.  She also has some clicking around the knee.  Anterior knee pain which is separate.  Previously diagnosed with a meniscus tear and underwent arthroscopic partial meniscectomy at [facility] in 2019 by Dr. Rashad [place].  She had physical therapy prior to that surgery but none after.  No recent physical therapy.  The pain recently 2 weeks ago got worse however over the anteromedial aspect of the knee.  MRI was ordered by orthopedic mid-level provider showing no meniscus tear but evidence of ACL abnormality/mucoid degeneration.  She now presents to me for her right knee.     Of note the patient states that her knee has continued to have pain and problems following her prior knee arthroscopy.  Primary complaint remains.  Denies any patellar instability symptoms.    PMHx notable for HTN, HLD, spinal cord stimulator.   Negative for tobacco.   Negative for diabetes. HgA1C 4.9 09/19/23    REVIEW OF SYSTEMS:   Constitution: Negative. Negative for chills, fever and night sweats.    Hematologic/Lymphatic: Negative for bleeding problem. Does not bruise/bleed easily.   Skin: Negative for dry skin, itching and rash.   Musculoskeletal: Negative for falls. Positive for right knee pain and muscle weakness.     All other review of symptoms were reviewed and found to be noncontributory.     PAST MEDICAL HISTORY:   Past Medical History:   Diagnosis Date    Dyslipidemia     Dysmetabolic syndrome X     Essential hypertension, benign     History of polycystic ovarian syndrome     Hypothyroidism     Obesity      PAST  SURGICAL HISTORY:   Past Surgical History:   Procedure Laterality Date    ADENOIDECTOMY      ESOPHAGOGASTRODUODENOSCOPY N/A 8/9/2023    Procedure: EGD (ESOPHAGOGASTRODUODENOSCOPY);  Surgeon: Fadi Pederson MD;  Location: [facility];  Service: Endoscopy;  Laterality: N/A;    SPINAL CORD STIMULATOR IMPLANT      TONSILLECTOMY      WISDOM TOOTH EXTRACTION       FAMILY HISTORY:   Family History   Problem Relation Name Age of Onset    Hypertension Paternal Grandfather      Diabetes Paternal Grandfather      Colon cancer Paternal Grandmother      Hypertension Paternal Grandmother      Diabetes Paternal Grandmother      Diabetes Maternal Grandmother      Hypertension Father      Hyperlipidemia Father      Thyroid disease Mother      Breast cancer Neg Hx      Ovarian cancer Neg Hx       SOCIAL HISTORY:   Social History     Socioeconomic History    Marital status:    Tobacco Use    Smoking status: Never    Smokeless tobacco: Never   Substance and Sexual Activity    Alcohol use: No    Drug use: No    Sexual activity: Yes     Partners: Male     Birth control/protection: I.U.D.   Social History Narrative    Lives at home with parents, 14 yo brother. No pets. Attending college in [employer], studying Mathematics and Computer Science. Denies alcohol, tobacco, drug use. Denies being sexually active.     Social Determinants of Health     Financial Resource Strain: Medium Risk (1/24/2024)    Overall Financial Resource Strain (CARDIA)     Difficulty of Paying Living Expenses: Somewhat hard   Food Insecurity: Food Insecurity Present (1/24/2024)    Hunger Vital Sign     Worried About Running Out of Food in the Last Year: Often true     Ran Out of Food in the Last Year: Never true   Transportation Needs: No Transportation Needs (1/24/2024)    PRAPARE - Transportation     Lack of Transportation (Medical): No     Lack of Transportation (Non-Medical): No   Physical Activity: Patient Declined (1/24/2024)    Exercise Vital Sign     Days  of Exercise per Week: Patient declined     Minutes of Exercise per Session: Patient declined   Stress: No Stress Concern Present (1/24/2024)    [employer] - Occupational Stress Questionnaire     Feeling of Stress : Only a little   Housing Stability: High Risk (1/24/2024)    Housing Stability Vital Sign     Unable to Pay for Housing in the Last Year: Yes     Number of Places Lived in the Last Year: 2     Unstable Housing in the Last Year: No     MEDICATIONS:     Current Outpatient Medications:     albuterol (PROVENTIL/VENTOLIN HFA) 90 mcg/actuation inhaler, INHALE 2 PUFFS BY MOUTH EVERY 4 TO 6 HOURS AS NEEDED FOR SHORTNESS OF BREATH OR WHEEZING, Disp: , Rfl:     aspirin 81 MG Chew, Take 81 mg by mouth once daily., Disp: , Rfl:     atorvastatin (LIPITOR) 10 MG tablet, Take 10 mg by mouth once daily., Disp: , Rfl:     biotin-keratin (BIOTIN PLUS KERATIN) 10,000-100 mcg-mg Tab, Keratin, Disp: , Rfl:     BREO ELLIPTA 100-25 mcg/dose diskus inhaler, Inhale 1 puff into the lungs once daily., Disp: , Rfl:     busPIRone (BUSPAR) 30 MG Tab, Take 30 mg by mouth 2 (two) times daily., Disp: , Rfl:     EPINEPHrine (EPIPEN) 0.3 mg/0.3 mL AtIn, as directed Injection prn anaphylaxis, Disp: , Rfl:     gabapentin (NEURONTIN) 300 MG capsule, Take 300 mg by mouth 2 (two) times daily., Disp: , Rfl:     HYDROcodone-acetaminophen (NORCO) 5-325 mg per tablet, Take 1 tablet by mouth every 8 (eight) hours as needed for Pain. (Patient taking differently: Take 7.5-325 tablets by mouth once daily.), Disp: 15 tablet, Rfl: 0    hydrOXYzine HCL (ATARAX) 25 MG tablet, Take 25 mg by mouth every 6 (six) hours as needed., Disp: , Rfl:     lansoprazole (PREVACID) 30 MG capsule, Take 1 capsule (30 mg total) by mouth once daily., Disp: 30 capsule, Rfl: 11    levomefolate calcium (L-METHYLFOLATE) 15 mg Tab, L-Methylfolate, Disp: , Rfl:     lisinopriL 10 MG tablet, Take 10 mg by mouth once daily., Disp: , Rfl:     lubiprostone  (AMITIZA) 24 MCG Cap, Take 1 capsule (24 mcg total) by mouth 2 (two) times daily., Disp: 60 capsule, Rfl: 11    magnesium 200 mg Tab, 400 mg daily, Disp: , Rfl:     metFORMIN (GLUCOPHAGE-XR) 500 MG ER 24hr tablet, Take 1 tablet (500 mg total) by mouth 2 (two) times daily with meals., Disp: 180 tablet, Rfl: 3    metoprolol succinate (TOPROL-XL) 50 MG 24 hr tablet, Take 50 mg by mouth once daily., Disp: , Rfl:     montelukast (SINGULAIR) 10 mg tablet, Take 10 mg by mouth once daily., Disp: , Rfl:     multivitamin-iron-folic acid (CENTRUM WOMEN) Tab, Centrum Women, Disp: , Rfl:     NURTEC 75 mg odt, Take 75 mg by mouth every other day., Disp: , Rfl:     omalizumab (XOLAIR) 150 mg/mL injection, , Disp: , Rfl:     omega 3-dha-epa-fish oil (FISH OIL) 1,000 mg (120 mg-180 mg) Cap, Take 1,000 mg by mouth 2 (two) times a day., Disp: , Rfl:     ondansetron (ZOFRAN) 8 MG tablet, Take 8 mg by mouth 2 (two) times daily as needed., Disp: , Rfl:     OXcarbazepine (TRILEPTAL) 300 MG Tab, Take 300 mg by mouth 2 (two) times daily., Disp: , Rfl:     pregabalin (LYRICA) 150 MG capsule, Take 150 mg by mouth 3 (three) times daily., Disp: , Rfl:     QULIPTA 60 mg Tab, Take 1 tablet by mouth., Disp: , Rfl:     rosuvastatin (CRESTOR) 20 MG tablet, Take 20 mg by mouth., Disp: , Rfl:     spironolactone (ALDACTONE) 100 MG tablet, Take 1 tablet (100 mg total) by mouth 2 (two) times daily., Disp: 90 tablet, Rfl: 3    tiZANidine (ZANAFLEX) 4 MG tablet, , Disp: , Rfl:     traZODone (DESYREL) 300 MG tablet, Take 300 mg by mouth once daily., Disp: , Rfl:     valACYclovir (VALTREX) 1000 MG tablet, Take 1,000 mg by mouth., Disp: , Rfl:     venlafaxine (EFFEXOR) 75 MG tablet, , Disp: , Rfl:     venlafaxine (EFFEXOR-XR) 150 MG Cp24, Take 150 mg by mouth once daily. 75mg with the 150 225mg, Disp: , Rfl:     vitamin D3-folic acid 125 mcg (5,000 unit)-1 mg Tab, Vitamin D, Disp: , Rfl:     zinc gluconate 50 mg tablet, Take 50 mg by mouth once daily., Disp:  , Rfl:     celecoxib (CELEBREX) 200 MG capsule, Take 1 capsule (200 mg total) by mouth once daily., Disp: 40 capsule, Rfl: 1    Current Facility-Administered Medications:     levonorgestreL (LILETTA) 20.4 mcg/24 hrs (8 yrs) 52 mg IUD 18.6 mcg, 18.6 mcg, Intrauterine, , Charley Merritt MD, 18.6 mcg at 11/29/23 1415    ALLERGIES:   Review of patient's allergies indicates:   Allergen Reactions    Diclofenac     Levalbuterol hcl      Other reaction(s): Headache      PHYSICAL EXAMINATION:  /81   Pulse 89   Wt 114 kg (251 lb 3.4 oz)   BMI 41.80 kg/m²   General: Well-developed well-nourished 29 y.o. femalein no acute distress   Cardiovascular: Regular rhythm by palpation of distal pulse, normal color and temperature, no concerning varicosities on symptomatic side   Lungs: No labored breathing or wheezing appreciated   Neuro: Alert and oriented ×3   Psychiatric: well oriented to person, place and time, demonstrates normal mood and affect   Skin: No rashes, lesions or ulcers, normal temperature, turgor, and texture on involved extremity    Ortho/SPM Exam  Examination of the right knee demonstrates intact extensor mechanism.  No effusion.  She does have some mild patellar facet tenderness.  No apprehension.  One quadrant lateral glide.  One quadrant medial glide.  Palpable patellar clicking.  This is mildly bothersome to her but not her primary complaint.  She does have some minimal medial joint line tenderness.  No lateral joint line tenderness.  Negative Stephany's maneuver. Lachman testing performed and provoked some of her typical complaints. 2A compared to the contralateral side.  Guards with pivot shift testing.  Negative posterior drawer.  Stable to varus and valgus stress.  Standing physiologic valgus alignment.    IMAGING:  X-rays 08/06/24 including standing, weight bearing AP and flexion bilateral knees, RIGHT knee lateral and sunrise views ordered and images reviewed by me show:    Joint spaces maintained.   Bony structures are intact.  No joint effusion is seen on either side.  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  Evidence of chronic lateral patellar maltracking.  Posterior tibial slope measures 17-18°.    MRI 8/12/24 of right knee reviewed by me and discussed with patient. Study shows:               Mucoid degeneration of ACL with ganglion cyst formation along the distal aspect. Somewhat irregular morphology raises concern for superimposed partial tear.     ASSESSMENT:      ICD-10-CM ICD-9-CM   1. Chronic instability of right knee  M23.51 718.86   2. Chronic pain of right knee  M25.561 719.46    G89.29 338.29   3. 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  History exam and imaging are suspicious for some degree of possible chronic ACL functional deficiency.  She does have some underlying lateral patellar maltracking as well.  Denies any patellar instability type events.  Discussed treatment options.  This has been a longstanding issue for her.  She has had no recent physical therapy within the last 5 years.  I have recommended a course of formal PT for quad and functional strengthening.  I would like to see how she does with that.  Return to clinic in 2 months for reassessment.  Should she have significant continued pain and problems despite extensive prior conservative treatment, we could consider ACL reconstruction as an option but certainly she has increased posterior tibial slope.  We would need to consider adding in a modified Tre LET in this case. The hope would be to avoid surgery.    Procedures

## 2024-08-27 ENCOUNTER — OFFICE VISIT (OUTPATIENT)
Dept: SPORTS MEDICINE | Facility: CLINIC | Age: 29
End: 2024-08-27
Payer: COMMERCIAL

## 2024-08-27 ENCOUNTER — PATIENT MESSAGE (OUTPATIENT)
Dept: SPORTS MEDICINE | Facility: CLINIC | Age: 29
End: 2024-08-27

## 2024-08-27 VITALS
WEIGHT: 251.19 LBS | BODY MASS INDEX: 41.8 KG/M2 | SYSTOLIC BLOOD PRESSURE: 114 MMHG | DIASTOLIC BLOOD PRESSURE: 81 MMHG | HEART RATE: 89 BPM

## 2024-08-27 DIAGNOSIS — M22.8X1 PATELLAR MALTRACKING, RIGHT: ICD-10-CM

## 2024-08-27 DIAGNOSIS — G89.29 CHRONIC PAIN OF RIGHT KNEE: ICD-10-CM

## 2024-08-27 DIAGNOSIS — M25.561 CHRONIC PAIN OF RIGHT KNEE: ICD-10-CM

## 2024-08-27 DIAGNOSIS — M23.51 CHRONIC INSTABILITY OF RIGHT KNEE: Primary | ICD-10-CM

## 2024-08-27 PROCEDURE — 4010F ACE/ARB THERAPY RXD/TAKEN: CPT | Mod: CPTII,S$GLB,, | Performed by: ORTHOPAEDIC SURGERY

## 2024-08-27 PROCEDURE — 99999 PR PBB SHADOW E&M-EST. PATIENT-LVL V: CPT | Mod: PBBFAC,,, | Performed by: ORTHOPAEDIC SURGERY

## 2024-08-27 PROCEDURE — 1159F MED LIST DOCD IN RCRD: CPT | Mod: CPTII,S$GLB,, | Performed by: ORTHOPAEDIC SURGERY

## 2024-08-27 PROCEDURE — 3008F BODY MASS INDEX DOCD: CPT | Mod: CPTII,S$GLB,, | Performed by: ORTHOPAEDIC SURGERY

## 2024-08-27 PROCEDURE — 3074F SYST BP LT 130 MM HG: CPT | Mod: CPTII,S$GLB,, | Performed by: ORTHOPAEDIC SURGERY

## 2024-08-27 PROCEDURE — 3079F DIAST BP 80-89 MM HG: CPT | Mod: CPTII,S$GLB,, | Performed by: ORTHOPAEDIC SURGERY

## 2024-08-27 PROCEDURE — 99214 OFFICE O/P EST MOD 30 MIN: CPT | Mod: S$GLB,,, | Performed by: ORTHOPAEDIC SURGERY

## 2024-08-27 RX ORDER — CELECOXIB 200 MG/1
200 CAPSULE ORAL DAILY
Qty: 40 CAPSULE | Refills: 1 | Status: SHIPPED | OUTPATIENT
Start: 2024-08-27

## 2024-09-14 DIAGNOSIS — E28.2 PCOS (POLYCYSTIC OVARIAN SYNDROME): ICD-10-CM

## 2024-09-16 RX ORDER — METFORMIN HYDROCHLORIDE 500 MG/1
500 TABLET, EXTENDED RELEASE ORAL 2 TIMES DAILY WITH MEALS
Qty: 180 TABLET | Refills: 0 | Status: SHIPPED | OUTPATIENT
Start: 2024-09-16

## 2024-09-25 ENCOUNTER — CLINICAL SUPPORT (OUTPATIENT)
Dept: REHABILITATION | Facility: HOSPITAL | Age: 29
End: 2024-09-25
Payer: COMMERCIAL

## 2024-09-25 DIAGNOSIS — M25.561 CHRONIC PAIN OF RIGHT KNEE: Primary | ICD-10-CM

## 2024-09-25 DIAGNOSIS — G89.29 CHRONIC PAIN OF RIGHT KNEE: Primary | ICD-10-CM

## 2024-09-25 DIAGNOSIS — R29.898 RIGHT LEG WEAKNESS: ICD-10-CM

## 2024-09-25 DIAGNOSIS — M25.661 DECREASED RANGE OF MOTION OF RIGHT KNEE: ICD-10-CM

## 2024-09-25 PROCEDURE — 97161 PT EVAL LOW COMPLEX 20 MIN: CPT | Mod: PN

## 2024-09-25 PROCEDURE — 97140 MANUAL THERAPY 1/> REGIONS: CPT | Mod: PN

## 2024-09-25 PROCEDURE — 97112 NEUROMUSCULAR REEDUCATION: CPT | Mod: PN

## 2024-09-26 NOTE — PLAN OF CARE
[facility]  Physical Therapy Initial Evaluation    Name: Charlene Mohan  Clinic Number: [id number]    Therapy Diagnosis:   Encounter Diagnoses   Name Primary?    Chronic pain of right knee Yes    Right leg weakness     Decreased range of motion of right knee      Physician: Maximiliano Chandra*    Physician Orders: PT Eval and Treat   Medical Diagnosis:   M23.51 (ICD-10-CM) - Chronic instability of right knee   M25.561,G89.29 (ICD-10-CM) - Chronic pain of right knee     Evaluation Date: 9/25/2024  Authorization Period Expiration: 12/31/2024  Plan of Care Certification Period: 9/25/2024 to 11/22/2024  Visit # / Visits authorized: 1/TBD  FOTO: 1/5    Time In: 0700  Time Out: 0750  Total Billable Time: 45 minutes (1 LCE, 1 MT, 2 NM)  Precautions: Standard    Subjective   Charlene is a 28 y/o WF. Primary complaint of chronic right knee pain with recent acute exacerbation. Initially injured her knee in 2019 while using the elliptical machine at the gym; hyperextension injury. Underwent meniscal surgery (she thinks medial side) right knee 2019. Pain never completely resolved. Has done a few brief rounds of formal OPPT as well as several CSIs that provided some temporary relief.  Voices continuing to have instability episodes where her knee will hyperextended. A more recent hyperextension episode around the time she saw the [facility] team caused her now increased medial knee pain.  She points to her anterior knee pain as the original place she was having pain. Denies any patellar subluxation events.     Past Medical History:   Diagnosis Date    Dyslipidemia     Dysmetabolic syndrome X     Essential hypertension, benign     History of polycystic ovarian syndrome     Hypothyroidism     Obesity      Charlene Mohan  has a past surgical history that includes Tonsillectomy; Adenoidectomy; [place] tooth extraction; Spinal cord stimulator implant; and Esophagogastroduodenoscopy (N/A,  "8/9/2023).    Charlene has a current medication list which includes the following prescription(s): albuterol, aspirin, atorvastatin, biotin plus keratin, breo ellipta, buspirone, celecoxib, epinephrine, gabapentin, hydrocodone-acetaminophen, hydroxyzine hcl, lansoprazole, levomefolate calcium, lisinopril, lubiprostone, magnesium, metformin, metoprolol succinate, montelukast, centrum women, nurtec, xolair, omega 3-dha-epa-fish oil, ondansetron, oxcarbazepine, pregabalin, qulipta, rosuvastatin, spironolactone, tizanidine, trazodone, valacyclovir, venlafaxine, venlafaxine, vitamin d3-folic acid, and zinc gluconate, and the following Facility-Administered Medications: levonorgestrel.    Review of patient's allergies indicates:   Allergen Reactions    Diclofenac     Levalbuterol hcl      Other reaction(s): Headache        Imaging:   Right knee MRI: 08/06/2024: Findings:   "Menisci:  There is no tear of the medial or lateral meniscus.  Ligaments:  There is mucoid degeneration of the ACL with ganglion cyst formation along the distal aspect.  Somewhat irregular morphology raises concern for superimposed partial tear.  PCL, MCL, and LCL complex are intact. Tendons:  Extensor mechanism is maintained.  Cartilage: Patellofemoral: Articular cartilage is maintained. Medial tibiofemoral: Articular cartilage is maintained. Lateral tibiofemoral: Articular cartilage is maintained. Bone: No fracture or marrow replacing process. Miscellaneous: There is no joint effusion."    Bilateral knee radiographs:  08/06/2024: Findings: "Joint spaces maintained. Bony structures are intact. No joint effusion is seen on either side."     Prior Therapy:  Went to initial evaluation at [facility] several weeks ago. Didn't return for follow-up.  Endorses good response from patellar kinesiotaping.     Social History:  Lives at home with family. From [place] but recently moved to [place]   Occupation:   Works for a [employer] company as a " .  Prior Level of Function:  Hx of right knee pain since initial injury 2019.   Current Level of Function:  See above.     Pain:  Current 3/10, worst 7/10, best 3/10   Location: right anterior, medial knee pain.   Description: Grabbing, sore    Pts goals:  1. To be rid of her knee pain.     Objective     Palpation:  Mild TTP lateral patellar border right.  Moderate TTP medial joint line. TTP patellar tendon.  Posture:  Standing alignment - valgus knee structure.   Effusion: No appreciated. Mild grace-patellar swelling    Gross Movement Analysis:  - Gait:   Slight knee flexion right knee during stance phase.  Right anterior knee pain with gait.   - Squats:  Deferred.     Range of Motion:   LE Right Left   Hip flexion WNL WNL   Hip abduction WNL WNL   Hip ER WNL WNL   Hip IR  WNL WNL   Hip extension WNL WNL   Knee (+) 5 -  0 - 125  Pinching pain with hyperextension OP.  Anterior knee pain with terminal flexion OP (+) 5 - 0 - 130   Ankle  WNL WNL     Lower Extremity Strength                 LE           Right           Left   Hip flexion: 4/5 4/5   Hip abduction 4/5 4/5   Hip extension 4/5 4/5   Hip adduction 4/5 4/5   Knee flexion 4/5 4/5   Knee extension 4/5 4/5   Ankle dorsiflexion: 5/5 5/5   Ankle plantarflexion: 5/5 NWB 5/5 NWB     Special Tests:   Right Left   Valgus Stress Test - -   Varus Stress test - -   Lachman's test - -   Posterior drawer - -   Anterior drawer - -   Stephany's Test - -   Ayleen's test - -   Patellar apprehension sign - -     Joint Mobility: 1 quadrant medial and lateral patellar glide bilateral   Sensation: intact light touch sensation to BLE throughout L2-S2 dermatomal pattern    CMS Impairment/Limitation/Restriction for FOTO Knee Survey    Therapist reviewed FOTO scores for Charlene Mohan on 9/25/2024.   FOTO documents entered into EPIC - see Media section.    Limitation Score: 50%  Predicted Limitation Score: 35%    KOOS, Jr: 59.4/100         TREATMENT   Treatment Time  In: 0730  Treatment Time Out: 0750  Total Treatment time separate from Evaluation time:30'    Charlene received the following manual therapy techniques: total time 10'  Grade I/II patellar mobs and Hoffa's pad mobs (this improved her quad set and Slr tolerance)  Hoffa's pad taping    Charlene participated in neuromuscular re-education activities to improve: Coordination, Kinesthetic, Sense, and Proprioception for 20' minutes.   Quad sets 2x10  Slr 1x10  Sidelying hip abd 1x10 RLE  Clamshells 2x10 RLE    Home Exercises and Patient Education Provided  Education provided re:   - PT diagnosis and recommended treatment course.  - home exercise program: light (grade I/II) patellar mobs, Hoffa's pad mobs, quad sets, straight leg raise, hip abd sidelying, clamshells    Written Home Exercises Provided: yes.  Exercises were reviewed and Charlene was able to demonstrate them prior to the end of the session.   Pt received a written copy of exercises to perform at home. Charlene demonstrated fair  understanding of the education provided.     See EMR under patient instructions for exercises given @ initial evaluation.      Assessment   Charlene is a 29 y.o. female referred to outpatient Physical Therapy with a medical diagnosis of M23.51 (ICD-10-CM) - Chronic instability of right knee, M25.561,G89.29 (ICD-10-CM) - Chronic pain of right knee. Hx of meniscal surgery in 2019 following hyperextension event. Continues to have knee pain, episodes of instability, and hyperextension type events. Clinical examination reveals mild lateral PFJ pain consistent with maltracking, anterior knee pain and mild medial joint line tenderness, weak hip and thigh muscles, painful gait, and aberrant movement patterns due to knee pain. Good response to light anterior/PFJ complex manual therapy followed by quad activation exercises.     Pt prognosis is Good.   Pt will benefit from skilled outpatient Physical Therapy to address the deficits stated above and in  the chart below, provide pt/family education, and to maximize pt's level of independence.     Plan of care discussed with patient: Yes  Pt's spiritual, cultural and educational needs considered and patient is agreeable to the plan of care and goals as stated below:     Anticipated Barriers for therapy: co-morbidities, chronicity of knee pain    Medical Necessity is demonstrated by the following  History  Co-morbidities and personal factors that may impact the plan of care Co-morbidities:   Allergies, Anxiety or Panic Disorders, Asthma, Back pain, BMI over 30, Depression, Gastrointestinal Disease, Headaches, High Blood Pressure, Prior Surgery, Sleep dysfunction    Personal Factors:   lifestyle     high   Examination  Body Structures and Functions, activity limitations and participation restrictions that may impact the plan of care Body Regions:   lower extremities    Body Systems:    gross symmetry  ROM  strength  balance  gait  motor control  edema  scar formation    Participation Restrictions:   See above    Activity limitations:   Learning and applying knowledge  no deficits    General Tasks and Commands  no deficits    Communication  no deficits    Mobility  lifting and carrying objects  Bending, squatting, kneeling    Self care  no deficits    Domestic Life  cooking  doing house work (cleaning house, washing dishes, laundry)    Interactions/Relationships  no deficits    Life Areas  no deficits    Community and Social Life  community life  recreation and leisure         moderate   Clinical Presentation stable and uncomplicated low   Decision Making/ Complexity Score: low     Goals:  Short Term Goals: 2-3 weeks   1.  Patient will demonstrate understanding of and compliance with home exercise program.   2.  Patient will demonstrate ability to perform straight leg raise consistently without anterior knee pain.   3.  Patient will report <4/10 right knee pain at worst throughout her pain as measure on NPRS.   4.   Patient will demonstrate RLE strength to at least 70% of L LE strength as measured via MicroFet handheld dynamometer in order to improve functional mobility     Long Term Goals: 8 weeks   1.  Patient will demonstrate ability to perform sit-to-stand (timed 30 seconds) x 10-12 reps with good symmetry and no knee pain.   2.  Patient will report > 9 point improvement on KOOS, Jr score.   3.  Patient will demonstrate R LE strength to at least 85% of L LE strength as measured via MicroFet handheld dynamometer in order to improve functional mobility.  4. Patient will report < 35% limitation on Knee FOTO survey.   5. Patient will demonstrate ability to negotiate 1 flight of stairs reciprocally without increased right knee pain.     Plan   Certification Period/Plan of care expiration: 9/25/2024 to 11/22/2024.    Outpatient Physical Therapy 12 times over the course of 8 weeks to include the following interventions: Gait Training, Manual Therapy, Moist Heat/ Ice, Neuromuscular Re-ed, Patient Education, Self Care, Therapeutic Activities, and Therapeutic Exercise, IASANDREW, MADELINE Zhong, PT, DPT, OCS

## 2024-09-30 NOTE — PROGRESS NOTES
[facility]   Physical Therapy Treatment Note      Name: Charlene BAEZ [facility]  Clinic Number: [id number]    Therapy Diagnosis:   Encounter Diagnoses   Name Primary?    Chronic pain of right knee Yes    Right leg weakness     Decreased range of motion of right knee      Physician: Maximiliano Chandra*    Visit Date: 10/1/2024    Physician Orders: PT Eval and Treat   Medical Diagnosis:   M23.51 (ICD-10-CM) - Chronic instability of right knee   M25.561,G89.29 (ICD-10-CM) - Chronic pain of right knee      Evaluation Date: 9/25/2024  Authorization Period Expiration: 12/31/2024  Plan of Care Certification Period: 9/25/2024 to 11/22/2024  Visit # / Visits authorized: 1/20  FOTO: 1/5  PTA visits: 0/5     Time In: 0700  Time Out: 0750  Total Billable Time: 50 minutes (1 MT, 2 NM, 1 TH)  Precautions: Standard    Subjective     Patient reports: for 1st f/u appointment. Reports knee feeling better overall. Minimal pain over the weekend.   She was compliant with home exercise program.  Response to previous treatment: good response  Functional change: walking better     Pain: 2/10  Location: right knee.      Objective      30 y/o WF. Chronic right knee pain with recent acute exacerbation.   Initially injured her knee in 2019 while using the elliptical machine at the gym; hyperextension injury.   Underwent meniscal surgery (she thinks medial side) right knee 2019. Pain never completely resolved.  Hx of several formal rounds of OPPT as well as several CSIs that provided some temporary relief.    Recent hyperextension episode around the time she saw the [facility] team caused her now increased medial knee pain.        Treatment     Charlene received the treatments listed below:      Manual therapy techniques: total time 15'  Grade I/II patellar mobs and Hoffa's pad mobs (this improved her quad set and Slr tolerance)  Hoffa's pad taping (K-tape)     Neuromuscular re-education activities to improve:  Coordination, Kinesthetic, Sense, and Proprioception for 25'    Quad sets 3x10 (1/2 towel progressed to hyperextended position)  Slr 2x10  Sidelying hip abd 3x10/each  Clamshells 3x10 RLE    Therapeutic activities: total time 10'  STS + 1 pad 2x5  Mini-squat 2x5 high box      Patient Education and Home Exercises       Education provided re:   - PT diagnosis and recommended treatment course.  - home exercise program: light (grade I/II) patellar mobs, Hoffa's pad mobs, quad sets, straight leg raise, hip abd sidelying, clamshells    Written Home Exercises Provided: Pt instructed to continue prior HEP. Exercises were reviewed and Charlene was able to demonstrate them prior to the end of the session.  Charlene demonstrated fair  understanding of the education provided. See Electronic Medical Record under Patient Instructions for exercises provided during therapy sessions    Assessment   Eval: Charlene is a 29 y.o. female referred to outpatient Physical Therapy with a medical diagnosis of M23.51 (ICD-10-CM) - Chronic instability of right knee, M25.561,G89.29 (ICD-10-CM) - Chronic pain of right knee. Hx of meniscal surgery in 2019 following hyperextension event. Continues to have knee pain, episodes of instability, and hyperextension type events. Clinical examination reveals mild lateral PFJ pain consistent with maltracking, anterior knee pain and mild medial joint line tenderness, weak hip and thigh muscles, painful gait, and aberrant movement patterns due to knee pain. Good response to light anterior/PFJ complex manual therapy followed by quad activation exercises.     Interval: Good initial response to hip/thigh NMR exercises, PFJ soft tissue work.  Monitor response to light CKC upper range work; mild medial knee joint line pain initially.  May need to adjust to mid-range CKC flexion based on patients' response.     Charlene Is progressing well towards her goals.   Patient prognosis is Good.     Patient will continue to  benefit from skilled outpatient physical therapy to address the deficits listed in the problem list box on initial evaluation, provide pt/family education and to maximize pt's level of independence in the home and community environment.     Patient's spiritual, cultural and educational needs considered and pt agreeable to plan of care and goals.     Anticipated barriers to physical therapy: co-morbidities, chronicity of knee pain     Goals:   Short Term Goals: 2-3 weeks   1.  Patient will demonstrate understanding of and compliance with home exercise program.  Met  2.  Patient will demonstrate ability to perform straight leg raise consistently without anterior knee pain.   3.  Patient will report <4/10 right knee pain at worst throughout her pain as measure on NPRS.   4.  Patient will demonstrate RLE strength to at least 70% of L LE strength as measured via MicroFet handheld dynamometer in order to improve functional mobility      Long Term Goals: 8 weeks --> progressing towards  1.  Patient will demonstrate ability to perform sit-to-stand (timed 30 seconds) x 10-12 reps with good symmetry and no knee pain.   2.  Patient will report > 9 point improvement on KOOS, Jr score.   3.  Patient will demonstrate R LE strength to at least 85% of L LE strength as measured via MicroFet handheld dynamometer in order to improve functional mobility.  4. Patient will report < 35% limitation on Knee FOTO survey.   5. Patient will demonstrate ability to negotiate 1 flight of stairs reciprocally without increased right knee pain.     Plan   Quad activation   Level 1 posterolateral hip strengthening  Level 1 CKC flexion activities --> trial of upper range    Andres VARINDER Zhnog, PT, DPT, OCS

## 2024-10-01 ENCOUNTER — CLINICAL SUPPORT (OUTPATIENT)
Dept: REHABILITATION | Facility: HOSPITAL | Age: 29
End: 2024-10-01
Payer: COMMERCIAL

## 2024-10-01 DIAGNOSIS — M25.561 CHRONIC PAIN OF RIGHT KNEE: Primary | ICD-10-CM

## 2024-10-01 DIAGNOSIS — R29.898 RIGHT LEG WEAKNESS: ICD-10-CM

## 2024-10-01 DIAGNOSIS — G89.29 CHRONIC PAIN OF RIGHT KNEE: Primary | ICD-10-CM

## 2024-10-01 DIAGNOSIS — M25.661 DECREASED RANGE OF MOTION OF RIGHT KNEE: ICD-10-CM

## 2024-10-01 PROCEDURE — 97140 MANUAL THERAPY 1/> REGIONS: CPT | Mod: PN

## 2024-10-01 PROCEDURE — 97530 THERAPEUTIC ACTIVITIES: CPT | Mod: PN

## 2024-10-01 PROCEDURE — 97112 NEUROMUSCULAR REEDUCATION: CPT | Mod: PN

## 2024-10-08 ENCOUNTER — CLINICAL SUPPORT (OUTPATIENT)
Dept: REHABILITATION | Facility: HOSPITAL | Age: 29
End: 2024-10-08
Payer: COMMERCIAL

## 2024-10-08 DIAGNOSIS — R29.898 RIGHT LEG WEAKNESS: ICD-10-CM

## 2024-10-08 DIAGNOSIS — G89.29 CHRONIC PAIN OF RIGHT KNEE: Primary | ICD-10-CM

## 2024-10-08 DIAGNOSIS — M25.561 CHRONIC PAIN OF RIGHT KNEE: Primary | ICD-10-CM

## 2024-10-08 DIAGNOSIS — M25.661 DECREASED RANGE OF MOTION OF RIGHT KNEE: ICD-10-CM

## 2024-10-08 PROCEDURE — 97140 MANUAL THERAPY 1/> REGIONS: CPT | Mod: PN

## 2024-10-08 PROCEDURE — 97530 THERAPEUTIC ACTIVITIES: CPT | Mod: PN

## 2024-10-08 PROCEDURE — 97112 NEUROMUSCULAR REEDUCATION: CPT | Mod: PN

## 2024-10-08 NOTE — PROGRESS NOTES
[facility]   Physical Therapy Treatment Note      Name: Charlene BAEZ [facility]  Clinic Number: [id number]    Therapy Diagnosis:   Encounter Diagnoses   Name Primary?    Chronic pain of right knee Yes    Right leg weakness     Decreased range of motion of right knee        Physician: Maximiliano Chandra*    Visit Date: 10/8/2024    Physician Orders: PT Eval and Treat   Medical Diagnosis:   M23.51 (ICD-10-CM) - Chronic instability of right knee   M25.561,G89.29 (ICD-10-CM) - Chronic pain of right knee      Evaluation Date: 9/25/2024  Authorization Period Expiration: 12/31/2024  Plan of Care Certification Period: 9/25/2024 to 11/22/2024  Visit # / Visits authorized: 2/20 (+eval)  FOTO: 3/5  PTA visits: 0/5     Time In: 0700  Time Out: 0750  Total Billable Time: 50 minutes (1 MT, 2 NM, 1 TH)  Precautions: Standard    Subjective     Patient reports: for 2nd f/u appointment. Reports knee feeling better overall. Minimal pain over the weekend.   She was compliant with home exercise program.  Response to previous treatment: good response to taping.   Functional change: walking better     Pain: 2/10  Location: right knee.      Objective      28 y/o WF. Chronic right knee pain with recent acute exacerbation.   Initially injured her knee in 2019 while using the elliptical machine at the gym; hyperextension injury.   Underwent meniscal surgery (she thinks medial side) right knee 2019. Pain never completely resolved.  Hx of several formal rounds of OPPT as well as several CSIs that provided some temporary relief.    Recent hyperextension episode around the time she saw the [facility] [facility] team caused her now increased medial knee pain.        Treatment     Charlene received the treatments listed below:      Manual therapy techniques: total time 15'  Grade I/II patellar mobs and Hoffa's pad mobs (this improved her quad set and Slr tolerance)  Hoffa's pad taping (Prabhakar tap today)     Neuromuscular  re-education activities to improve: Coordination, Kinesthetic, Sense, and Proprioception for 25'    Quad sets 3x10 (1/2 towel progressed to hyperextended position)  Slr 4x10 1.5#  Sidelying hip abd 3x10/each 2#  Clamshells 2x15 each    Therapeutic activities: total time 10'  STS + 1 pad 2x5 NP  Mini-squat 2x5 high box NP  Leg press dL 5 plates 2x10 --> sL 4 plates 2x10 (upper range)      Patient Education and Home Exercises       Education provided re:   - PT diagnosis and recommended treatment course.  - home exercise program: light (grade I/II) patellar mobs, Hoffa's pad mobs, quad sets, straight leg raise, hip abd sidelying, clamshells    Written Home Exercises Provided: Pt instructed to continue prior HEP. Exercises were reviewed and Charlene was able to demonstrate them prior to the end of the session.  Charlene demonstrated fair  understanding of the education provided. See Electronic Medical Record under Patient Instructions for exercises provided during therapy sessions    Assessment   Eval: Charlene is a 29 y.o. female referred to outpatient Physical Therapy with a medical diagnosis of M23.51 (ICD-10-CM) - Chronic instability of right knee, M25.561,G89.29 (ICD-10-CM) - Chronic pain of right knee. Hx of meniscal surgery in 2019 following hyperextension event. Continues to have knee pain, episodes of instability, and hyperextension type events. Clinical examination reveals mild lateral PFJ pain consistent with maltracking, anterior knee pain and mild medial joint line tenderness, weak hip and thigh muscles, painful gait, and aberrant movement patterns due to knee pain. Good response to light anterior/PFJ complex manual therapy followed by quad activation exercises.     Interval: Good initial response to hip/thigh NMR exercises, PFJ soft tissue work.  Monitor response to light CKC upper range work; no pain today with this. Initiated single-leg light CKC loading.     Charlene Is progressing well towards her goals.    Patient prognosis is Good.     Patient will continue to benefit from skilled outpatient physical therapy to address the deficits listed in the problem list box on initial evaluation, provide pt/family education and to maximize pt's level of independence in the home and community environment.   Patient's spiritual, cultural and educational needs considered and pt agreeable to plan of care and goals.     Anticipated barriers to physical therapy: co-morbidities, chronicity of knee pain     Goals:   Short Term Goals: 2-3 weeks   1.  Patient will demonstrate understanding of and compliance with home exercise program.  Met  2.  Patient will demonstrate ability to perform straight leg raise consistently without anterior knee pain.  Met  3.  Patient will report <4/10 right knee pain at worst throughout her pain as measure on NPRS.   4.  Patient will demonstrate RLE strength to at least 70% of L LE strength as measured via MicroFet handheld dynamometer in order to improve functional mobility      Long Term Goals: 8 weeks --> progressing towards  1.  Patient will demonstrate ability to perform sit-to-stand (timed 30 seconds) x 10-12 reps with good symmetry and no knee pain.   2.  Patient will report > 9 point improvement on KOOS, Jr score.   3.  Patient will demonstrate R LE strength to at least 85% of L LE strength as measured via MicroFet handheld dynamometer in order to improve functional mobility.  4. Patient will report < 35% limitation on Knee FOTO survey.   5. Patient will demonstrate ability to negotiate 1 flight of stairs reciprocally without increased right knee pain.     Plan   Quad activation   Level 1 posterolateral hip strengthening  Level 1 CKC flexion activities --> trial of upper range    Andres VARINDER Zhong, PT, DPT, OCS

## 2024-10-11 ENCOUNTER — CLINICAL SUPPORT (OUTPATIENT)
Dept: REHABILITATION | Facility: HOSPITAL | Age: 29
End: 2024-10-11
Payer: COMMERCIAL

## 2024-10-11 DIAGNOSIS — M25.661 DECREASED RANGE OF MOTION OF RIGHT KNEE: ICD-10-CM

## 2024-10-11 DIAGNOSIS — M25.561 CHRONIC PAIN OF RIGHT KNEE: Primary | ICD-10-CM

## 2024-10-11 DIAGNOSIS — R29.898 RIGHT LEG WEAKNESS: ICD-10-CM

## 2024-10-11 DIAGNOSIS — G89.29 CHRONIC PAIN OF RIGHT KNEE: Primary | ICD-10-CM

## 2024-10-11 PROCEDURE — 97140 MANUAL THERAPY 1/> REGIONS: CPT | Mod: PN,CQ

## 2024-10-11 PROCEDURE — 97530 THERAPEUTIC ACTIVITIES: CPT | Mod: PN,CQ

## 2024-10-11 PROCEDURE — 97112 NEUROMUSCULAR REEDUCATION: CPT | Mod: PN,CQ

## 2024-10-11 NOTE — PROGRESS NOTES
[facility]   Physical Therapy Treatment Note      Name: Charlene BAEZ [facility]  Clinic Number: [id number]    Therapy Diagnosis:   Encounter Diagnoses   Name Primary?    Chronic pain of right knee Yes    Right leg weakness     Decreased range of motion of right knee        Physician: Maximiliano Chandra*    Visit Date: 10/11/2024    Physician Orders: PT Eval and Treat   Medical Diagnosis:   M23.51 (ICD-10-CM) - Chronic instability of right knee   M25.561,G89.29 (ICD-10-CM) - Chronic pain of right knee      Evaluation Date: 9/25/2024  Authorization Period Expiration: 12/31/2024  Plan of Care Certification Period: 9/25/2024 to 11/22/2024  Visit # / Visits authorized: 3/20 (+eval)  FOTO: 4/5 done today  PTA visits: 1/5     Time In: 0705  Time Out: 0755  Total Billable Time: 50 minutes (1 MT, 2 NM, 1 TH)  Precautions: Standard    Subjective     Patient reports: for 3rd f/u appointment. More knee irritability today below kneecap. Attributes to walking more yesterday.   She was compliant with home exercise program.  Response to previous treatment: good response to taping.   Functional change: walking better     Pain: 2/10  Location: right knee.      Objective      28 y/o WF. Chronic right knee pain with recent acute exacerbation.   Initially injured her knee in 2019 while using the elliptical machine at the gym; hyperextension injury.   Underwent meniscal surgery (she thinks medial side) right knee 2019. Pain never completely resolved.  Hx of several formal rounds of OPPT as well as several CSIs that provided some temporary relief.    Recent hyperextension episode around the time she saw the [facility] team caused her now increased medial knee pain.        Treatment     Charlene received the treatments listed below:      Manual therapy techniques: total time 15'  Grade I/II patellar mobs and Hoffa's pad mobs (this improved her quad set and Slr tolerance)  Hoffa's pad taping (K tape today)      Neuromuscular re-education activities to improve: Coordination, Kinesthetic, Sense, and Proprioception for 25'    Quad sets 3x10 (1/2 towel progressed to hyperextended position)  Slr 4x10 1.5#  Sidelying hip abd 3x10/each 2#  Clamshells 2x15 each    Therapeutic activities: total time 10'  STS + 1 pad 2x5 NP  Mini-squat 2x5 high box NP  Leg press dL 5 plates 2x10 --> sL 4 plates 2x10 (upper range)      Patient Education and Home Exercises       Education provided re:   - PT diagnosis and recommended treatment course.  - home exercise program: light (grade I/II) patellar mobs, Hoffa's pad mobs, quad sets, straight leg raise, hip abd sidelying, clamshells    Written Home Exercises Provided: Pt instructed to continue prior HEP. Exercises were reviewed and Charlene was able to demonstrate them prior to the end of the session.  Charlene demonstrated fair  understanding of the education provided. See Electronic Medical Record under Patient Instructions for exercises provided during therapy sessions    Assessment   Eval: Charlene is a 29 y.o. female referred to outpatient Physical Therapy with a medical diagnosis of M23.51 (ICD-10-CM) - Chronic instability of right knee, M25.561,G89.29 (ICD-10-CM) - Chronic pain of right knee. Hx of meniscal surgery in 2019 following hyperextension event. Continues to have knee pain, episodes of instability, and hyperextension type events. Clinical examination reveals mild lateral PFJ pain consistent with maltracking, anterior knee pain and mild medial joint line tenderness, weak hip and thigh muscles, painful gait, and aberrant movement patterns due to knee pain. Good response to light anterior/PFJ complex manual therapy followed by quad activation exercises.     Interval: Good initial response to hip/thigh NMR exercises, PFJ soft tissue work. Knee joint more irritated today at inferior pole. Session primarily focused on un-weighted hip/thigh NMR exercises. K-taping applied. Monitor  response.     Charlene Is progressing well towards her goals.   Patient prognosis is Good.     Patient will continue to benefit from skilled outpatient physical therapy to address the deficits listed in the problem list box on initial evaluation, provide pt/family education and to maximize pt's level of independence in the home and community environment.   Patient's spiritual, cultural and educational needs considered and pt agreeable to plan of care and goals.     Anticipated barriers to physical therapy: co-morbidities, chronicity of knee pain     Goals:   Short Term Goals: 2-3 weeks   1.  Patient will demonstrate understanding of and compliance with home exercise program.  Met  2.  Patient will demonstrate ability to perform straight leg raise consistently without anterior knee pain.  Met  3.  Patient will report <4/10 right knee pain at worst throughout her pain as measure on NPRS.   4.  Patient will demonstrate RLE strength to at least 70% of L LE strength as measured via MicroFet handheld dynamometer in order to improve functional mobility      Long Term Goals: 8 weeks --> progressing towards  1.  Patient will demonstrate ability to perform sit-to-stand (timed 30 seconds) x 10-12 reps with good symmetry and no knee pain.   2.  Patient will report > 9 point improvement on KOOS, Jr score.   3.  Patient will demonstrate R LE strength to at least 85% of L LE strength as measured via MicroFet handheld dynamometer in order to improve functional mobility.  4. Patient will report < 35% limitation on Knee FOTO survey.   5. Patient will demonstrate ability to negotiate 1 flight of stairs reciprocally without increased right knee pain.     Plan   Quad activation   Level 1 posterolateral hip strengthening  Level 1 CKC flexion activities --> trial of upper range    Jasmyn Coe PTA

## 2024-10-17 RX ORDER — CELECOXIB 200 MG/1
200 CAPSULE ORAL
Qty: 30 CAPSULE | Refills: 2 | Status: SHIPPED | OUTPATIENT
Start: 2024-10-17

## 2024-10-27 DIAGNOSIS — K21.9 GASTROESOPHAGEAL REFLUX DISEASE, UNSPECIFIED WHETHER ESOPHAGITIS PRESENT: ICD-10-CM

## 2024-10-28 RX ORDER — LANSOPRAZOLE 30 MG/1
30 CAPSULE, DELAYED RELEASE ORAL
Qty: 90 CAPSULE | Refills: 2 | Status: SHIPPED | OUTPATIENT
Start: 2024-10-28

## 2025-01-07 ENCOUNTER — OFFICE VISIT (OUTPATIENT)
Facility: CLINIC | Age: 30
End: 2025-01-07
Payer: COMMERCIAL

## 2025-01-07 VITALS
WEIGHT: 250 LBS | SYSTOLIC BLOOD PRESSURE: 118 MMHG | HEART RATE: 88 BPM | DIASTOLIC BLOOD PRESSURE: 80 MMHG | BODY MASS INDEX: 41.65 KG/M2 | HEIGHT: 65 IN

## 2025-01-07 DIAGNOSIS — G47.30 INSOMNIA WITH SLEEP APNEA: ICD-10-CM

## 2025-01-07 DIAGNOSIS — G43.E09 CHRONIC MIGRAINE WITH AURA WITHOUT STATUS MIGRAINOSUS, NOT INTRACTABLE: Primary | ICD-10-CM

## 2025-01-07 DIAGNOSIS — Z78.9 CAFFEINE USE: ICD-10-CM

## 2025-01-07 DIAGNOSIS — G47.00 INSOMNIA WITH SLEEP APNEA: ICD-10-CM

## 2025-01-07 PROCEDURE — 99204 OFFICE O/P NEW MOD 45 MIN: CPT | Mod: S$GLB,,, | Performed by: PHYSICIAN ASSISTANT

## 2025-01-07 PROCEDURE — G2211 COMPLEX E/M VISIT ADD ON: HCPCS | Mod: S$GLB,,, | Performed by: PHYSICIAN ASSISTANT

## 2025-01-07 PROCEDURE — 99999 PR PBB SHADOW E&M-EST. PATIENT-LVL V: CPT | Mod: PBBFAC,,, | Performed by: PHYSICIAN ASSISTANT

## 2025-01-07 PROCEDURE — 3079F DIAST BP 80-89 MM HG: CPT | Mod: CPTII,S$GLB,, | Performed by: PHYSICIAN ASSISTANT

## 2025-01-07 PROCEDURE — 1159F MED LIST DOCD IN RCRD: CPT | Mod: CPTII,S$GLB,, | Performed by: PHYSICIAN ASSISTANT

## 2025-01-07 PROCEDURE — 1160F RVW MEDS BY RX/DR IN RCRD: CPT | Mod: CPTII,S$GLB,, | Performed by: PHYSICIAN ASSISTANT

## 2025-01-07 PROCEDURE — 3008F BODY MASS INDEX DOCD: CPT | Mod: CPTII,S$GLB,, | Performed by: PHYSICIAN ASSISTANT

## 2025-01-07 PROCEDURE — 3074F SYST BP LT 130 MM HG: CPT | Mod: CPTII,S$GLB,, | Performed by: PHYSICIAN ASSISTANT

## 2025-01-07 RX ORDER — SUMATRIPTAN SUCCINATE 100 MG/1
TABLET ORAL
Qty: 18 TABLET | Refills: 5 | Status: SHIPPED | OUTPATIENT
Start: 2025-01-07 | End: 2026-01-07

## 2025-01-07 RX ORDER — ATOGEPANT 60 MG/1
60 TABLET ORAL DAILY
Qty: 30 TABLET | Refills: 11 | Status: SHIPPED | OUTPATIENT
Start: 2025-01-07 | End: 2026-01-07

## 2025-01-07 RX ORDER — UBROGEPANT 100 MG/1
TABLET ORAL
Qty: 16 TABLET | Refills: 5 | Status: SHIPPED | OUTPATIENT
Start: 2025-01-07

## 2025-01-07 NOTE — PROGRESS NOTES
New Patient     SUBJECTIVE:  Patient ID: Charlene Mohan   MRN: [id number]  Referred By: Aaareferral Self  Chief Complaint: Migraine      History of Present Illness:   29 y.o. female with migraines, chronic pain of knee, right leg weakness, hashimoto's thyroiditis, NEELAM, insomnia, obesity, PCOS, insulin resistance, HTN, HLD, de quervain tenosynovitis, SOB, liver steatosis, dysmetabolic syndrome x, hypothryodiism, chronic regional pain syndrome, fibromyalgia, post herpetic neuralgia, asthma, Alissa Hunt Syndrome, who presents to clinic alone for evaluation of headaches.   PMHx negative for TBI, Meningitis, Aneurysms, Kidney Stones, asthma, GI bleed, osteoporosis, CAD/MI, CVA/TIA, DM, cancer  Family Hx negative for Migraines     Pt has a h/o ha's since her childhood that worsened in 2021 until becoming daily. She previously saw neurologists in [place] Dr. Benitez and Dr. Rick Tinoco. Medications tried and failed are listed below. Her ha's began to improve w/ Quilipta 60mg/d started 1 yr ago w/ a frequency of 1-2/30 ha days per month. However, over the last few months, her ha's have worsened despite quilipta use. They are now occurring 20/30 days per month. She even had a 21 day intractable HA last month. She is taking ubrelvy 100mg prn which helps abort ha's. She was recently seen in the ED on 6/11/24 where CTH was obtained and was unremarkable.   Location/Radiation - top of head, hemispheric, retro-orbital  Quality - pounding,  pulsating  Duration - 2 hrs - 21 days  Intensity (range) - mild to severe  Triggers - elevated bp, sunlight, bright lights, loud noises  Aggravating Factors - lights, sounds, osmophobia  Alleviating Factors - caffeine, medical marijuana  Recent Changes - none identified   Prodrome/Aura - irritability, drowsiness, blurry vision, floaters in vision  Time of day of most headaches- anytime   Sleep - NEELAM not currently using cpap  Caffeine - 1-2 coffee or tea per day  Associated symptoms with the  headache: photo/phono/osmophobia, nausea, dizziness, vision changes. She denies all other ROS.     Treatments Tried   Ajovy - 2 rounds, didn't help  Aimovig - 4-5 rounds, didn't help  Emgality - didn't help  Quilipta  60mg - helps  Tpx 50mg bid  Gabapentin - possibly helped  Lyrica - currently taking   Metoprolol   Propranolol - avoid 2/2 asthma hx  Trileptal   Effexor 225mg  Cymbalta - nausea   Maxalt   Imitrex tab and NS   Ubrelvy 100mg - helps  Nurtec - didn't help  Fioricet - doesn't help  Hydroxyzine - doesn't help ha's  Tylenol   Celebrex - taken daily for knee pain  Zofran 8mg - somewhat helps  Phenergan - helps, drowsiness    Social History  Alcohol - denies  Smoke - denies  Recreational Drug Use- denies  Occupation -     Current Medications:    Current Outpatient Medications:     albuterol (PROVENTIL/VENTOLIN HFA) 90 mcg/actuation inhaler, INHALE 2 PUFFS BY MOUTH EVERY 4 TO 6 HOURS AS NEEDED FOR SHORTNESS OF BREATH OR WHEEZING, Disp: , Rfl:     aspirin 81 MG Chew, Take 81 mg by mouth once daily., Disp: , Rfl:     biotin-keratin (BIOTIN PLUS KERATIN) 10,000-100 mcg-mg Tab, Keratin, Disp: , Rfl:     BREO ELLIPTA 100-25 mcg/dose diskus inhaler, Inhale 1 puff into the lungs once daily., Disp: , Rfl:     celecoxib (CELEBREX) 200 MG capsule, TAKE 1 CAPSULE BY MOUTH ONCE DAILY., Disp: 30 capsule, Rfl: 2    EPINEPHrine (EPIPEN) 0.3 mg/0.3 mL AtIn, as directed Injection prn anaphylaxis, Disp: , Rfl:     hydrOXYzine HCL (ATARAX) 25 MG tablet, Take 25 mg by mouth every 6 (six) hours as needed., Disp: , Rfl:     lansoprazole (PREVACID) 30 MG capsule, TAKE 1 CAPSULE BY MOUTH EVERY DAY, Disp: 90 capsule, Rfl: 2    levomefolate calcium (L-METHYLFOLATE) 15 mg Tab, L-Methylfolate, Disp: , Rfl:     lisinopriL 10 MG tablet, Take 10 mg by mouth once daily., Disp: , Rfl:     magnesium 200 mg Tab, 400 mg daily, Disp: , Rfl:     metFORMIN (GLUCOPHAGE-XR) 500 MG ER 24hr tablet, Take 1 tablet (500 mg total)  by mouth 2 (two) times daily with meals., Disp: 180 tablet, Rfl: 0    metoprolol succinate (TOPROL-XL) 50 MG 24 hr tablet, Take 50 mg by mouth once daily., Disp: , Rfl:     montelukast (SINGULAIR) 10 mg tablet, Take 10 mg by mouth once daily., Disp: , Rfl:     multivitamin-iron-folic acid (CENTRUM WOMEN) Tab, Centrum Women, Disp: , Rfl:     omega 3-dha-epa-fish oil (FISH OIL) 1,000 mg (120 mg-180 mg) Cap, Take 1,000 mg by mouth 2 (two) times a day., Disp: , Rfl:     ondansetron (ZOFRAN) 8 MG tablet, Take 8 mg by mouth 2 (two) times daily as needed., Disp: , Rfl:     OXcarbazepine (TRILEPTAL) 300 MG Tab, Take 300 mg by mouth 2 (two) times daily., Disp: , Rfl:     pregabalin (LYRICA) 150 MG capsule, Take 150 mg by mouth 3 (three) times daily., Disp: , Rfl:     rosuvastatin (CRESTOR) 20 MG tablet, Take 20 mg by mouth., Disp: , Rfl:     spironolactone (ALDACTONE) 100 MG tablet, Take 1 tablet (100 mg total) by mouth 2 (two) times daily., Disp: 90 tablet, Rfl: 3    traZODone (DESYREL) 300 MG tablet, Take 300 mg by mouth once daily., Disp: , Rfl:     venlafaxine (EFFEXOR) 75 MG tablet, , Disp: , Rfl:     venlafaxine (EFFEXOR-XR) 150 MG Cp24, Take 150 mg by mouth once daily. 75mg with the 150 225mg, Disp: , Rfl:     vitamin D3-folic acid 125 mcg (5,000 unit)-1 mg Tab, Vitamin D, Disp: , Rfl:     zinc gluconate 50 mg tablet, Take 50 mg by mouth once daily., Disp: , Rfl:     atogepant (QULIPTA) 60 mg Tab, Take 1 tablet (60 mg total) by mouth once daily., Disp: 30 tablet, Rfl: 11    atorvastatin (LIPITOR) 10 MG tablet, Take 10 mg by mouth once daily., Disp: , Rfl:     sumatriptan (IMITREX) 100 MG tablet, Take 1 tab PO at onset of migraine, can repeat in 2 hrs if needed.  No more than twice per day or 3 days/wk., Disp: 18 tablet, Rfl: 5    ubrogepant (UBRELVY) 100 mg tablet, Take 1 tablet by mouth at the onset of a headache. May repeat based on response and tolerability after more than 2 hours if needed. Do not take more than  "200mg in a 24 hour span., Disp: 16 tablet, Rfl: 5    valACYclovir (VALTREX) 1000 MG tablet, Take 1,000 mg by mouth., Disp: , Rfl:     Current Facility-Administered Medications:     levonorgestreL (LILETTA) 20.4 mcg/24 hrs (8 yrs) 52 mg IUD 18.6 mcg, 18.6 mcg, Intrauterine, , Charley Merritt MD, 18.6 mcg at 11/29/23 1415    Review of Systems - as per HPI, otherwise a balanced 10 systems review is negative.    OBJECTIVE:  Vitals:  /80   Pulse 88   Ht 5' 5" (1.651 m)   Wt 113.4 kg (250 lb)   BMI 41.60 kg/m²     Physical Exam   Constitutional: she appears well-developed and well-nourished. she is well groomed. NAD  HENT:    Head: Normocephalic and atraumatic, Frontalis was NTTP, temporalis was NTTP   Eyes: Conjunctivae and EOM are normal. Pupils are equal, round, and reactive to light   Neck: Neck supple. Occiput and trapezius NTTP , traps tight  Musculoskeletal: Normal range of motion. No joint stiffness. No vertebral point tenderness.  Skin: Skin is warm and dry.  Psychiatric: Normal mood and affect.     Neuro exam:    Mental status:  The patient is alert and oriented to person, place and time.  Language is intact and fluent  Remote and recent memory are intact  Normal attention and concentration  Mood is stable    Cranial Nerves:  Pupils are equal and reactive to light.    Extraocular movements are intact and without nystagmus.    Facial movement is symmetric.  Facial sensation is intact.    Hearing is intact   FROM of neck in all (6) directions without pain  Shoulder shrug symmetrical.    Coordination:     Finger to nose - normal and symmetric bilaterally     Motor:  Normal muscle bulk and symmetry. No fasciculations were noted.   Tremor not apparent   Pronator drift not apparent.    strength was strong and symmetric  RUE:appropriate against gravity and medium force as tested 5/5  LUE: appropriate against gravity and medium force as tested 5/5  RLE:appropriate against gravity and medium force as tested " 5/5              LLE: appropriate against gravity and medium force as tested 5/5    Sensory:  RUE  intact light touch  LUE intact light touch  RLE intact light touch  LLE Slightly decreased    Gait:   Normal gait    Review of Data:   Notes from neuro reviewed   Labs:  No visits with results within 3 Month(s) from this visit.   Latest known visit with results is:   Hospital Outpatient Visit on 06/05/2024   Component Date Value Ref Range Status    85% Max Predicted HR 06/05/2024 162   Final    Max Predicted HR 06/05/2024 191   Final    [facility] CV CPX PATIENT IS MALE 06/05/2024 0.0   Final    [facility] CV CPX PATIENT IS FEMALE 06/05/2024 1.0   Final    HR at rest 06/05/2024 97  bpm Final    Systolic blood pressure 06/05/2024 104  mmHg Final    Diastolic blood pressure 06/05/2024 66  mmHg Final    RPP 06/05/2024 10,088   Final    Exercise duration (min) 06/05/2024 5  minutes Final    Exercise duration (sec) 06/05/2024 59  seconds Final    Peak HR 06/05/2024 162  bpm Final    Peak Systolic BP 06/05/2024 218  mmHg Final    Peak Diatolic BP 06/05/2024 59  mmHg Final    Peak RPP 06/05/2024 35,316   Final    Estimated METs 06/05/2024 6.8   Final    % Max HR Achieved 06/05/2024 90   Final    1 Minute Recovery HR 06/05/2024 155  bpm Final     Imaging:  Results for orders placed or performed during the hospital encounter of 09/04/22   CT Head Without Contrast    Narrative    EXAMINATION:  CT HEAD WITHOUT CONTRAST    CLINICAL HISTORY:  Headache, new or worsening, neuro deficit (Age 19-49y);    TECHNIQUE:  Low dose axial CT images obtained throughout the head without intravenous contrast. Sagittal and coronal reconstructions were performed.    COMPARISON:  MRI brain from 05/26/2022.    FINDINGS:  Ventricles and sulci are normal in size for age without evidence of hydrocephalus. No extra-axial blood or fluid collections.  The brain parenchyma is normal. No parenchymal mass, hemorrhage, edema or major vascular distribution infarct.    No  calvarial fracture.  The scalp is unremarkable.  Bilateral paranasal sinuses and mastoid air cells are clear.      Impression    No acute intracranial abnormality.      Electronically signed by: Abhinav Matias  Date:    09/04/2022  Time:    20:29   Results for orders placed or performed during the hospital encounter of 05/26/22   MRI Brain Without Contrast    Narrative    EXAMINATION:  MRI BRAIN WITHOUT CONTRAST    CLINICAL HISTORY:  tn; Trigeminal neuralgia    TECHNIQUE:  MRI of the brain was performed in multiple planes and sequences.    COMPARISON:  Head CT 12/04/2021, MRI brain 07/21/2021.    FINDINGS:  Ventricles normal in size.  No mass or mass effect.  No signs of acute infarct or hemorrhage.  Normal pituitary.  Expected central flow voids visualized.      Impression    Normal MRI of the brain.      Electronically signed by: Ramírez Chavez MD  Date:    05/26/2022  Time:    14:47   Results for orders placed or performed during the hospital encounter of 07/21/21   MRI Brain W WO Contrast    Narrative    EXAMINATION:  MRI BRAIN W WO CONTRAST    CLINICAL HISTORY:  Trigeminal neuralgiatrigemia;    TECHNIQUE:  MRI of the brain was performed in multiple planes and sequences.    COMPARISON:  06/24/2021.    FINDINGS:  The ventricles and midline structures are normal.  No acute ischemia trace mucosal thickening in the inferior right maxillary sinus.  Flow void is present in the carotid and basilar arteries consistent with patency.  Images were obtained with gadolinium no enhancing lesions.      Impression    Normal MR of the brain      Electronically signed by: Debi Tirado MD  Date:    07/21/2021  Time:    15:38     Note: I have independently reviewed any/all imaging/labs/tests and agree with the report (s) as documented.  Any discrepancies will be as noted/demarcated by free text.  BON KING 1/7/2025    ASSESSMENT:  1. Chronic migraine with aura without status migrainosus, not intractable    2. Insomnia with sleep  apnea    3. Caffeine use        BOTOX  The patient has chronic migraines (G43.719) and suffers from headaches more than 15 days a month lasting more than 4 hours a day with no relief of symptoms despite trying multiple medications (listed above). Botox treatment was approved for chronic migraines in October 2010. The patient will be an ideal candidate for Botox. We are planning for 3 treatments 3 months apart and aiming for at least 50% improvement in the symptoms. If we see no improvement after 3 treatments, we will discontinue the injections.  Injection sites planned:   muscle bilaterally ( a total of 10 units divided into 2 sites)   Procerus muscle (5 units)   Frontalis muscle bilaterally (a total of 20 units divided into 4 sites)   Temporalis muscle bilaterally (a total of 40 units divided into 8 sites)   Occipitalis muscle bilaterally (a total of 30 units divided into 6 sites)   Cervical paraspinal muscles (a total of 20 units divided into 4 sites)   Trapezius muscle bilaterally (a total of 30 units divided into 6 sites)   Total Botox to be used: 155 Units   Unavoidable waste: 45 Units     PLAN:  - Discussed symptoms appear to be consistent with migrianes, discussed treatment options and patient agreed with the following plan:  - ppx - continue quilipta 60mg/d, start botox  - abortive - continue ubrelvy 100mg prn, try imitrex 100mg tab (if fails consider inj next) as 2nd line  - nausea - defers  - h/o NEELAM - not currently using cpap, wt loss since sleep study, referral to sleep med to eval  - d/c caffeine  - risks, benefits, and potential side effects of botox, quilipta, ubrelvy, imitrex discussed   - alternative treatment options offered   - importance of healthy diet, regular exercise and sleep hygiene in the treatment of headaches    - Start tracking headaches via Migraine Gama alverto on phone   - RTC in 4 wks to begin botox     Orders Placed This Encounter    Ambulatory referral/consult to Sleep  Disorders    Prior authorization Order    sumatriptan (IMITREX) 100 MG tablet    ubrogepant (UBRELVY) 100 mg tablet    atogepant (QULIPTA) 60 mg Tab       I have discussed realistic goals of care with patient at length as well as medication options, and need for lifestyle adjustment. I have explained that treatment will take time. We have agreed that the goal will be to reduce frequency/intensity/quantity of HA, not to be completely HA free. I have explained my non narcotic policy regarding headache treatment.    Patient agreeable to work on lifestyle adjustments.    Discussed potential for teratogenicity with treatment, patient understands if her family planning status should change she will contact office immediately and we will safely adjust medications as needed.     Questions and concerns were sought and answered to the patient's stated verbal satisfaction.  The patient verbalizes understanding and agreement with the above stated treatment plan.     CC: Gemma Shaw, FNP      Belen Hanley PA-C  [facility]  [phone]    Dr. Arndt was available during today's encounter.     Visit today included increased complexity associated with the care of the episodic problem migraines addressed and managing the longitudinal care of the patient due to the serious and/or complex managed problem(s) migraines.

## 2025-01-17 ENCOUNTER — PATIENT MESSAGE (OUTPATIENT)
Facility: CLINIC | Age: 30
End: 2025-01-17
Payer: COMMERCIAL

## 2025-01-28 ENCOUNTER — PATIENT MESSAGE (OUTPATIENT)
Facility: CLINIC | Age: 30
End: 2025-01-28
Payer: COMMERCIAL

## 2025-02-17 ENCOUNTER — PATIENT MESSAGE (OUTPATIENT)
Facility: CLINIC | Age: 30
End: 2025-02-17
Payer: COMMERCIAL

## 2025-03-19 ENCOUNTER — PROCEDURE VISIT (OUTPATIENT)
Facility: CLINIC | Age: 30
End: 2025-03-19
Payer: COMMERCIAL

## 2025-03-19 VITALS
SYSTOLIC BLOOD PRESSURE: 126 MMHG | DIASTOLIC BLOOD PRESSURE: 91 MMHG | WEIGHT: 250 LBS | BODY MASS INDEX: 41.65 KG/M2 | HEIGHT: 65 IN | HEART RATE: 79 BPM

## 2025-03-19 DIAGNOSIS — G43.E09 CHRONIC MIGRAINE WITH AURA WITHOUT STATUS MIGRAINOSUS, NOT INTRACTABLE: Primary | ICD-10-CM

## 2025-03-19 NOTE — PROCEDURES
Established Patient  Procedure Note    SUBJECTIVE:  Patient ID: Charlene Mohan  Chief Complaint: Botulinum Toxin Injection      History of Present Illness:  Charlene Mohan is a 29 y.o. female with migraines, chronic pain of knee, right leg weakness, hashimoto's thyroiditis, NEELAM, insomnia, obesity, PCOS, insulin resistance, HTN, HLD, de quervain tenosynovitis, SOB, liver steatosis, dysmetabolic syndrome x, hypothryodiism, chronic regional pain syndrome, fibromyalgia, post herpetic neuralgia, asthma, Alissa Hunt Syndrome,  who presents to clinic alone for follow-up of headaches and Botox injections.       03/19/2025- botox #1    Recommendations made at last Office Visit on 1/7/25:  - Discussed symptoms appear to be consistent with migrianes, discussed treatment options and patient agreed with the following plan:  - ppx - continue quilipta 60mg/d, start botox  - abortive - continue ubrelvy 100mg prn, try imitrex 100mg tab (if fails consider inj next) as 2nd line  - nausea - defers  - h/o NEELAM - not currently using cpap, wt loss since sleep study, referral to sleep med to eval  - d/c caffeine  - risks, benefits, and potential side effects of botox, quilipta, ubrelvy, imitrex discussed   - alternative treatment options offered   - importance of healthy diet, regular exercise and sleep hygiene in the treatment of headaches    - Start tracking headaches via Migraine Gama alverto on phone   - RTC in 4 wks to begin botox     Treatments Tried:   Ajovy - 2 rounds, didn't help  Aimovig - 4-5 rounds, didn't help  Emgality - didn't help  Quilipta  60mg - helps  Tpx 50mg bid  Gabapentin - possibly helped  Lyrica - currently taking   Metoprolol   Propranolol - avoid 2/2 asthma hx  Trileptal   Effexor 225mg  Cymbalta - nausea   Maxalt   Imitrex tab and NS   Ubrelvy 100mg - helps  Nurtec - didn't help  Fioricet - doesn't help  Hydroxyzine - doesn't help ha's  Tylenol   Celebrex - taken daily for knee pain  Zofran 8mg - somewhat  "helps  Phenergan - helps, drowsiness    Current Medications:  Current Medications[1]    Review of Systems - as per HPI, otherwise a balanced 10 systems review is negative.    OBJECTIVE:  Vitals:  BP (!) 126/91   Pulse 79   Ht 5' 5" (1.651 m)   Wt 113.4 kg (250 lb)   BMI 41.60 kg/m²     Physical Exam:  Constitutional: she appears well-developed and well-nourished. she is well groomed. NAD   HENT:    Head: Normocephalic and atraumatic  Eyes: Conjunctivae and EOM are normal  Musculoskeletal: Normal range of motion. No joint stiffness.   Skin: Skin is warm and dry.  Psychiatric: Mood and affect are normal    Neuro: Patient is alert and oriented to person, place, and time. Language is intact and fluent.  Recent and remote memory are intact.  Normal attention and concentration.  Facial movement is symmetric.  Gait is normal.     Review of Data:   Notes from neuro reviewed.   Labs:  No visits with results within 3 Month(s) from this visit.   Latest known visit with results is:   Hospital Outpatient Visit on 06/05/2024   Component Date Value Ref Range Status    85% Max Predicted HR 06/05/2024 162   Final    Max Predicted HR 06/05/2024 191   Final    [facility] CV CPX PATIENT IS MALE 06/05/2024 0.0   Final    [facility] CV CPX PATIENT IS FEMALE 06/05/2024 1.0   Final    HR at rest 06/05/2024 97  bpm Final    Systolic blood pressure 06/05/2024 104  mmHg Final    Diastolic blood pressure 06/05/2024 66  mmHg Final    RPP 06/05/2024 10,088   Final    Exercise duration (min) 06/05/2024 5  minutes Final    Exercise duration (sec) 06/05/2024 59  seconds Final    Peak HR 06/05/2024 162  bpm Final    Peak Systolic BP 06/05/2024 218  mmHg Final    Peak Diatolic BP 06/05/2024 59  mmHg Final    Peak RPP 06/05/2024 35,316   Final    Estimated METs 06/05/2024 6.8   Final    % Max HR Achieved 06/05/2024 90   Final    1 Minute Recovery HR 06/05/2024 155  bpm Final     Imaging:  Results for orders placed or performed during the hospital encounter of " 09/04/22   CT Head Without Contrast    Narrative    EXAMINATION:  CT HEAD WITHOUT CONTRAST    CLINICAL HISTORY:  Headache, new or worsening, neuro deficit (Age 19-49y);    TECHNIQUE:  Low dose axial CT images obtained throughout the head without intravenous contrast. Sagittal and coronal reconstructions were performed.    COMPARISON:  MRI brain from 05/26/2022.    FINDINGS:  Ventricles and sulci are normal in size for age without evidence of hydrocephalus. No extra-axial blood or fluid collections.  The brain parenchyma is normal. No parenchymal mass, hemorrhage, edema or major vascular distribution infarct.    No calvarial fracture.  The scalp is unremarkable.  Bilateral paranasal sinuses and mastoid air cells are clear.      Impression    No acute intracranial abnormality.      Electronically signed by: Abhinav Matias  Date:    09/04/2022  Time:    20:29   Results for orders placed or performed during the hospital encounter of 05/26/22   MRI Brain Without Contrast    Narrative    EXAMINATION:  MRI BRAIN WITHOUT CONTRAST    CLINICAL HISTORY:  tn; Trigeminal neuralgia    TECHNIQUE:  MRI of the brain was performed in multiple planes and sequences.    COMPARISON:  Head CT 12/04/2021, MRI brain 07/21/2021.    FINDINGS:  Ventricles normal in size.  No mass or mass effect.  No signs of acute infarct or hemorrhage.  Normal pituitary.  Expected central flow voids visualized.      Impression    Normal MRI of the brain.      Electronically signed by: Ramírez Chavez MD  Date:    05/26/2022  Time:    14:47   Results for orders placed or performed during the hospital encounter of 07/21/21   MRI Brain W WO Contrast    Narrative    EXAMINATION:  MRI BRAIN W WO CONTRAST    CLINICAL HISTORY:  Trigeminal neuralgiatrigemia;    TECHNIQUE:  MRI of the brain was performed in multiple planes and sequences.    COMPARISON:  06/24/2021.    FINDINGS:  The ventricles and midline structures are normal.  No acute ischemia trace mucosal  thickening in the inferior right maxillary sinus.  Flow void is present in the carotid and basilar arteries consistent with patency.  Images were obtained with gadolinium no enhancing lesions.      Impression    Normal MR of the brain      Electronically signed by: Debi Tirado MD  Date:    07/21/2021  Time:    15:38       Note: I have independently reviewed any/all imaging/labs/tests and agree with the report (s) as documented.  Any discrepancies will be as noted/demarcated by free text.  BON KING 3/19/2025    ASSESSMENT:  1. Chronic migraine with aura without status migrainosus, not intractable        PLAN:  - Discussed symptoms appear to be consistent with migrianes, discussed treatment options and patient agreed with the following plan:  - ppx - continue quilipta 60mg/d, start botox  - Botox administered in clinic for Chronic Migraine (see below)   - abortive - continue ubrelvy 100mg prn, try imitrex 100mg tab (if fails consider inj next) as 2nd line  - nausea - defers  - h/o NEELAM - not currently using cpap, wt loss since sleep study, referral to sleep med to eval  - d/c caffeine  - risks, benefits, and potential side effects of botox, quilipta, ubrelvy, imitrex discussed   - alternative treatment options offered   - importance of healthy diet, regular exercise and sleep hygiene in the treatment of headaches    - Start tracking headaches via Migraine Gama alverto on phone   - RTC in 12 weeks for repeat Botox injections or sooner if needed     Orders Placed This Encounter    onabotulinumtoxina injection 200 Units       All questions and concerns addressed.  Patient verbalizes understanding and is agreeable with the above stated treatment plan.      PROCEDURE NOTE:  BOTOX was performed as an indicated therapy for intractable chronic migraine headaches given that the patient failed more than 2 headache medications    Two patient identifiers were confirmed with the patient prior to performing this procedure. A time out  to determine correct patient and and agreement on procedure performed was conducted prior to the procedure.      Botulinum Toxin Injection Procedure   Procedure: Botulinum toxin injection (72682)  After risks and benefits were explained including bleeding, infection, worsening of pain, damage to the areas being injected, weakness of muscles, loss of muscle control, dysphagia if injecting the head or neck, facial droop if injecting the facial area, painful injection, allergic or other reaction to the medications being injected, and the failure of the procedure to help the problem, a signed consent was obtained.   The patient was placed in a comfortable area and the sites to be treated were identified.The area to be treated was prepped three times with alcohol and the alcohol allowed to dry. Next, a 30 gauge needle was used to inject the medication in the area to be treated.      Total Botox used: 155 Units   Botox wastage: 45 Units     Injection sites:    muscle bilaterally ( a total of 10 units divided into 2 sites)   Procerus muscle (5 units)   Frontalis muscle bilaterally (a total of 20 units divided into 4 sites)   Temporalis muscle bilaterally (a total of 40 units divided into 8 sites)   Occipitalis muscle bilaterally (a total of 30 units divided into 6 sites)   Cervical paraspinal muscles (a total of 20 units divided into 4 sites)   Trapezius muscle bilaterally (a total of 30 units divided into 6 sites)   Complications: none   RTC for the next Botox injection: 12 weeks     The patient tolerated the procedure well and did not experience any complications.     CC: Gemma Shaw, FNP       Belen Hanley PA-C  [facility]   [phone]    Dr. Arndt was available during today's encounter.        [1]   Current Outpatient Medications:     albuterol (PROVENTIL/VENTOLIN HFA) 90 mcg/actuation inhaler, INHALE 2 PUFFS BY MOUTH EVERY 4 TO 6 HOURS AS NEEDED FOR SHORTNESS OF BREATH OR  WHEEZING, Disp: , Rfl:     aspirin 81 MG Chew, Take 81 mg by mouth once daily., Disp: , Rfl:     atogepant (QULIPTA) 60 mg Tab, Take 1 tablet (60 mg total) by mouth once daily., Disp: 30 tablet, Rfl: 11    biotin-keratin (BIOTIN PLUS KERATIN) 10,000-100 mcg-mg Tab, Keratin, Disp: , Rfl:     BREO ELLIPTA 100-25 mcg/dose diskus inhaler, Inhale 1 puff into the lungs once daily., Disp: , Rfl:     celecoxib (CELEBREX) 200 MG capsule, TAKE 1 CAPSULE BY MOUTH ONCE DAILY., Disp: 30 capsule, Rfl: 2    EPINEPHrine (EPIPEN) 0.3 mg/0.3 mL AtIn, as directed Injection prn anaphylaxis, Disp: , Rfl:     hydrOXYzine HCL (ATARAX) 25 MG tablet, Take 25 mg by mouth every 6 (six) hours as needed., Disp: , Rfl:     lansoprazole (PREVACID) 30 MG capsule, TAKE 1 CAPSULE BY MOUTH EVERY DAY, Disp: 90 capsule, Rfl: 2    levomefolate calcium (L-METHYLFOLATE) 15 mg Tab, L-Methylfolate, Disp: , Rfl:     lisinopriL 10 MG tablet, Take 10 mg by mouth once daily., Disp: , Rfl:     magnesium 200 mg Tab, 400 mg daily, Disp: , Rfl:     metFORMIN (GLUCOPHAGE-XR) 500 MG ER 24hr tablet, Take 1 tablet (500 mg total) by mouth 2 (two) times daily with meals., Disp: 180 tablet, Rfl: 0    metoprolol succinate (TOPROL-XL) 50 MG 24 hr tablet, Take 50 mg by mouth once daily., Disp: , Rfl:     montelukast (SINGULAIR) 10 mg tablet, Take 10 mg by mouth once daily., Disp: , Rfl:     multivitamin-iron-folic acid (CENTRUM WOMEN) Tab, Centrum Women, Disp: , Rfl:     omega 3-dha-epa-fish oil (FISH OIL) 1,000 mg (120 mg-180 mg) Cap, Take 1,000 mg by mouth 2 (two) times a day., Disp: , Rfl:     ondansetron (ZOFRAN) 8 MG tablet, Take 8 mg by mouth 2 (two) times daily as needed., Disp: , Rfl:     OXcarbazepine (TRILEPTAL) 300 MG Tab, Take 300 mg by mouth 2 (two) times daily., Disp: , Rfl:     pregabalin (LYRICA) 150 MG capsule, Take 150 mg by mouth 3 (three) times daily., Disp: , Rfl:     rosuvastatin (CRESTOR) 20 MG tablet, Take 20 mg by mouth., Disp: , Rfl:      spironolactone (ALDACTONE) 100 MG tablet, Take 1 tablet (100 mg total) by mouth 2 (two) times daily., Disp: 90 tablet, Rfl: 3    sumatriptan (IMITREX) 100 MG tablet, Take 1 tab PO at onset of migraine, can repeat in 2 hrs if needed.  No more than twice per day or 3 days/wk., Disp: 18 tablet, Rfl: 5    traZODone (DESYREL) 300 MG tablet, Take 300 mg by mouth once daily., Disp: , Rfl:     ubrogepant (UBRELVY) 100 mg tablet, Take 1 tablet by mouth at the onset of a headache. May repeat based on response and tolerability after more than 2 hours if needed. Do not take more than 200mg in a 24 hour span., Disp: 16 tablet, Rfl: 5    venlafaxine (EFFEXOR) 75 MG tablet, , Disp: , Rfl:     venlafaxine (EFFEXOR-XR) 150 MG Cp24, Take 150 mg by mouth once daily. 75mg with the 150 225mg, Disp: , Rfl:     vitamin D3-folic acid 125 mcg (5,000 unit)-1 mg Tab, Vitamin D, Disp: , Rfl:     zinc gluconate 50 mg tablet, Take 50 mg by mouth once daily., Disp: , Rfl:     atorvastatin (LIPITOR) 10 MG tablet, Take 10 mg by mouth once daily., Disp: , Rfl:     valACYclovir (VALTREX) 1000 MG tablet, Take 1,000 mg by mouth., Disp: , Rfl:     Current Facility-Administered Medications:     levonorgestreL (LILETTA) 20.4 mcg/24 hrs (8 yrs) 52 mg IUD 18.6 mcg, 18.6 mcg, Intrauterine, , Charley Merritt MD, 18.6 mcg at 11/29/23 7369

## 2025-03-21 ENCOUNTER — PATIENT MESSAGE (OUTPATIENT)
Facility: CLINIC | Age: 30
End: 2025-03-21
Payer: COMMERCIAL

## 2025-04-21 ENCOUNTER — PATIENT MESSAGE (OUTPATIENT)
Facility: CLINIC | Age: 30
End: 2025-04-21
Payer: COMMERCIAL

## 2025-04-21 DIAGNOSIS — G43.E09 CHRONIC MIGRAINE WITH AURA WITHOUT STATUS MIGRAINOSUS, NOT INTRACTABLE: Primary | ICD-10-CM

## 2025-04-21 RX ORDER — ELETRIPTAN HYDROBROMIDE 20 MG/1
TABLET, FILM COATED ORAL
Qty: 18 TABLET | Refills: 5 | Status: SHIPPED | OUTPATIENT
Start: 2025-04-21 | End: 2026-04-21

## 2025-06-11 ENCOUNTER — PATIENT MESSAGE (OUTPATIENT)
Facility: CLINIC | Age: 30
End: 2025-06-11
Payer: COMMERCIAL

## 2025-06-11 NOTE — TELEPHONE ENCOUNTER
Called pt.   Pt is planning to actively start family planning. Advised d/c of quilipta, ubrelvy, relpax, and botox. Advised tylenol prn. May use imitrex prn if family planning is placed on hold. Pt also inquired about addition of clomid (estrogen containing product) with migraines w/ aura. Discussed data and risks. Pt to f/up w/ obgyn to discuss options. Will rtc prn.

## 2025-06-23 ENCOUNTER — PATIENT MESSAGE (OUTPATIENT)
Facility: CLINIC | Age: 30
End: 2025-06-23
Payer: COMMERCIAL

## 2025-06-30 ENCOUNTER — OFFICE VISIT (OUTPATIENT)
Facility: CLINIC | Age: 30
End: 2025-06-30
Payer: COMMERCIAL

## 2025-06-30 DIAGNOSIS — G43.E09 CHRONIC MIGRAINE WITH AURA WITHOUT STATUS MIGRAINOSUS, NOT INTRACTABLE: Primary | ICD-10-CM

## 2025-06-30 PROCEDURE — 1160F RVW MEDS BY RX/DR IN RCRD: CPT | Mod: CPTII,95,, | Performed by: PHYSICIAN ASSISTANT

## 2025-06-30 PROCEDURE — 1159F MED LIST DOCD IN RCRD: CPT | Mod: CPTII,95,, | Performed by: PHYSICIAN ASSISTANT

## 2025-06-30 PROCEDURE — 98006 SYNCH AUDIO-VIDEO EST MOD 30: CPT | Mod: 95,,, | Performed by: PHYSICIAN ASSISTANT

## 2025-06-30 PROCEDURE — 4010F ACE/ARB THERAPY RXD/TAKEN: CPT | Mod: CPTII,95,, | Performed by: PHYSICIAN ASSISTANT

## 2025-06-30 PROCEDURE — G2211 COMPLEX E/M VISIT ADD ON: HCPCS | Mod: 95,,, | Performed by: PHYSICIAN ASSISTANT

## 2025-06-30 RX ORDER — METHYLPREDNISOLONE 4 MG/1
TABLET ORAL
Qty: 1 EACH | Refills: 0 | Status: SHIPPED | OUTPATIENT
Start: 2025-06-30

## 2025-06-30 RX ORDER — METOPROLOL SUCCINATE 100 MG/1
100 TABLET, EXTENDED RELEASE ORAL DAILY
Qty: 90 TABLET | Refills: 1 | Status: SHIPPED | OUTPATIENT
Start: 2025-06-30 | End: 2025-12-27

## 2025-06-30 RX ORDER — NARATRIPTAN 1 MG/1
TABLET ORAL
Qty: 18 TABLET | Refills: 5 | Status: SHIPPED | OUTPATIENT
Start: 2025-06-30 | End: 2025-12-30

## 2025-06-30 NOTE — PROGRESS NOTES
Established Patient     The patient location is: [place]  The chief complaint leading to consultation is: headache follow up visit    Visit type: audiovisual    Face to Face time with patient: 20 min  32 minutes of total time spent on the encounter, which includes face to face time and non-face to face time preparing to see the patient (eg, review of tests), Obtaining and/or reviewing separately obtained history, Documenting clinical information in the electronic or other health record, Independently interpreting results (not separately reported) and communicating results to the patient/family/caregiver, or Care coordination (not separately reported).     Each patient to whom he or she provides medical services by telemedicine is:  (1) informed of the relationship between the physician and patient and the respective role of any other health care provider with respect to management of the patient; and (2) notified that he or she may decline to receive medical services by telemedicine and may withdraw from such care at any time.    Notes:        SUBJECTIVE:  Patient ID: Charlene Mohan   Chief Complaint: Headache    History of Present Illness:  Charlene Mohan is a 29 y.o. female with migraines, chronic pain of knee, right leg weakness, hashimoto's thyroiditis, NEELAM, insomnia, obesity, PCOS, insulin resistance, HTN, HLD, de quervain tenosynovitis, SOB, liver steatosis, dysmetabolic syndrome x, hypothryodiism, chronic regional pain syndrome, fibromyalgia, post herpetic neuralgia, asthma, Alissa Hunt Syndrome, who presents via virtual visit alone for follow-up of headaches.       06/30/2025 - Interval History:  Since last visit w/ pt. She has been experiencing an increase in her ha's. Changes include recent d/c of lyrica and botox. She subsequently had an ED visit 6/20 w/ intractable migraine x 3d. She was given morphine 4mg x 2, compazine 5mg inj x 2, diphenhdyramine 12.5mg inj, and medrol 125mg inj that temporarily helped.  She was seen in  6/25 due to continued HA w/ associated sore throat, cough, and nasal congestion given z-rogelio and benzonatate which somewhat helped. Ha's are occurring approximately 6 days a week. She takes tylenol and excedrin w/ no relief noted. Also taking mag glycinate daily. Discussed options and potential pregnancy/fetal risks. Pt would like the following:   Plan: increase metoprolol 100mg/d, continue mag, medrol dose pack to break current cycle, try amerge 1mg, d/c otcs rtc 1-2 mo     06/11/2025 - Telephone Encounter:  Called pt.   Pt is planning to actively start family planning. Advised d/c of quilipta, ubrelvy, relpax, and botox. Advised tylenol prn. May use imitrex prn if family planning is placed on hold. Pt also inquired about addition of clomid (estrogen containing product) with migraines w/ aura. Discussed data and risks. Pt to f/up w/ obgyn to discuss options. Will rtc prn.    04/21/2025 - [facility]  Due to se's w/ imitrex, pt was switched to relpax.     03/19/2025- botox #1     Recommendations made at last Office Visit on 1/7/25:  - Discussed symptoms appear to be consistent with migrianes, discussed treatment options and patient agreed with the following plan:  - ppx - continue quilipta 60mg/d, start botox  - abortive - continue ubrelvy 100mg prn, try imitrex 100mg tab (if fails consider inj next) as 2nd line  - nausea - defers  - h/o NEELAM - not currently using cpap, wt loss since sleep study, referral to sleep med to eval  - d/c caffeine  - risks, benefits, and potential side effects of botox, quilipta, ubrelvy, imitrex discussed   - alternative treatment options offered   - importance of healthy diet, regular exercise and sleep hygiene in the treatment of headaches    - Start tracking headaches via Migraine Gama alverto on phone   - RTC in 4 wks to begin botox      Treatments Tried:   Ajovy - 2 rounds, didn't help  Aimovig - 4-5 rounds, didn't help  Emgality - didn't help  Quilipta  60mg -  helps  Tpx 50mg bid  Gabapentin - possibly helped  Lyrica - possibly helped  Metoprolol   Propranolol - avoid 2/2 asthma hx  Botox - possibly helped, 1 round completed  3/19/25  Trileptal   Effexor 225mg  Cymbalta - nausea   Maxalt   Imitrex tab and NS - SE's  Relpax - costly  Ubrelvy 100mg - helps  Nurtec - didn't help  Fioricet - doesn't help  Hydroxyzine - doesn't help ha's  Tylenol   Celebrex - taken daily for knee pain  Zofran 8mg - somewhat helps  Phenergan - helps, drowsiness    Current Medications:  Current Medications[1]    Review of Systems - as per HPI, otherwise a balanced 10 systems review is negative.    OBJECTIVE:  Vitals:  There were no vitals taken for this visit.     Physical Exam:  Constitutional: she appears well-developed and well-nourished. she is well groomed. NAD   HENT:    Head: Normocephalic and atraumatic  Eyes: Conjunctivae and EOM are normal  Musculoskeletal: Normal range of motion. No joint stiffness.   Psychiatric: Mood and affect are normal    Neuro: Patient is alert and oriented to person, place, and time. Language is intact and fluent. Speech is clear and fluent. Recent and remote memory are intact.  Normal attention and concentration.  Facial movement is symmetric. Moves all 4 extremities against gravity.      Review of Data:   Notes from neuro, ed reviewed   Labs:  No visits with results within 3 Month(s) from this visit.   Latest known visit with results is:   Hospital Outpatient Visit on 06/05/2024   Component Date Value Ref Range Status    85% Max Predicted HR 06/05/2024 162   Final    Max Predicted HR 06/05/2024 191   Final    [facility] CV CPX PATIENT IS MALE 06/05/2024 0.0   Final    [facility] CV CPX PATIENT IS FEMALE 06/05/2024 1.0   Final    HR at rest 06/05/2024 97  bpm Final    Systolic blood pressure 06/05/2024 104  mmHg Final    Diastolic blood pressure 06/05/2024 66  mmHg Final    RPP 06/05/2024 10,088   Final    Exercise duration (min) 06/05/2024 5  minutes Final    Exercise  duration (sec) 06/05/2024 59  seconds Final    Peak HR 06/05/2024 162  bpm Final    Peak Systolic BP 06/05/2024 218  mmHg Final    Peak Diatolic BP 06/05/2024 59  mmHg Final    Peak RPP 06/05/2024 35,316   Final    Estimated METs 06/05/2024 6.8   Final    % Max HR Achieved 06/05/2024 90   Final    1 Minute Recovery HR 06/05/2024 155  bpm Final     Imaging:  Results for orders placed or performed during the hospital encounter of 09/04/22   CT Head Without Contrast    Narrative    EXAMINATION:  CT HEAD WITHOUT CONTRAST    CLINICAL HISTORY:  Headache, new or worsening, neuro deficit (Age 19-49y);    TECHNIQUE:  Low dose axial CT images obtained throughout the head without intravenous contrast. Sagittal and coronal reconstructions were performed.    COMPARISON:  MRI brain from 05/26/2022.    FINDINGS:  Ventricles and sulci are normal in size for age without evidence of hydrocephalus. No extra-axial blood or fluid collections.  The brain parenchyma is normal. No parenchymal mass, hemorrhage, edema or major vascular distribution infarct.    No calvarial fracture.  The scalp is unremarkable.  Bilateral paranasal sinuses and mastoid air cells are clear.      Impression    No acute intracranial abnormality.      Electronically signed by: Abhinav Matias  Date:    09/04/2022  Time:    20:29   Results for orders placed or performed during the hospital encounter of 05/26/22   MRI Brain Without Contrast    Narrative    EXAMINATION:  MRI BRAIN WITHOUT CONTRAST    CLINICAL HISTORY:  tn; Trigeminal neuralgia    TECHNIQUE:  MRI of the brain was performed in multiple planes and sequences.    COMPARISON:  Head CT 12/04/2021, MRI brain 07/21/2021.    FINDINGS:  Ventricles normal in size.  No mass or mass effect.  No signs of acute infarct or hemorrhage.  Normal pituitary.  Expected central flow voids visualized.      Impression    Normal MRI of the brain.      Electronically signed by: Ramírez Chavez  MD  Date:    05/26/2022  Time:    14:47   Results for orders placed or performed during the hospital encounter of 07/21/21   MRI Brain W WO Contrast    Narrative    EXAMINATION:  MRI BRAIN W WO CONTRAST    CLINICAL HISTORY:  Trigeminal neuralgiatrigemia;    TECHNIQUE:  MRI of the brain was performed in multiple planes and sequences.    COMPARISON:  06/24/2021.    FINDINGS:  The ventricles and midline structures are normal.  No acute ischemia trace mucosal thickening in the inferior right maxillary sinus.  Flow void is present in the carotid and basilar arteries consistent with patency.  Images were obtained with gadolinium no enhancing lesions.      Impression    Normal MR of the brain      Electronically signed by: Debi Tirado MD  Date:    07/21/2021  Time:    15:38     Note: I have independently reviewed any/all imaging/labs/tests and agree with the report (s) as documented.  Any discrepancies will be as noted/demarcated by free text.  BON KING 6/30/2025    ASSESSMENT:  1. Chronic migraine with aura without status migrainosus, not intractable        PLAN:  - Discussed symptoms appear to be consistent with migrianes, discussed treatment options and patient agreed with the following plan:  - pt is family planning, discussed options, risks/benefits  - ppx - increase metoprolol, also taking effexor  - abortive - try amerge 1mg  - medrol dose pack to break current cycle, not currently pregnant  - h/o NEELAM - not currently using cpap, wt loss since sleep study, referral to sleep med to eval  - d/c caffeine   - track ha's  - Discussed goals of therapy are to decrease the frequency, intensity, and duration of headaches  - RTC in 2 mo     Orders Placed This Encounter    metoprolol succinate (TOPROL-XL) 100 MG 24 hr tablet    naratriptan (AMERGE) 1 MG Tab    methylPREDNISolone (MEDROL DOSEPACK) 4 mg tablet       Discussed potential for teratogenicity with treatment, patient understands if her family planning status should  change she will contact office immediately and we will safely adjust medications as needed.     Questions and concerns were sought and answered to the patient's stated verbal satisfaction.  The patient verbalizes understanding and agreement with the above stated treatment plan.     CC: Gemma Shaw, FNP      Belen Hanley PA-C  [facility]   [phone]    Dr. Arndt was available during today's encounter.     Visit today included increased complexity associated with the care of the episodic problem migraines addressed and managing the longitudinal care of the patient due to the serious and/or complex managed problem(s) migraines.           [1]   Current Outpatient Medications:     albuterol (PROVENTIL/VENTOLIN HFA) 90 mcg/actuation inhaler, INHALE 2 PUFFS BY MOUTH EVERY 4 TO 6 HOURS AS NEEDED FOR SHORTNESS OF BREATH OR WHEEZING, Disp: , Rfl:     aspirin 81 MG Chew, Take 81 mg by mouth once daily., Disp: , Rfl:     atorvastatin (LIPITOR) 10 MG tablet, Take 10 mg by mouth once daily., Disp: , Rfl:     biotin-keratin (BIOTIN PLUS KERATIN) 10,000-100 mcg-mg Tab, Keratin, Disp: , Rfl:     BREO ELLIPTA 100-25 mcg/dose diskus inhaler, Inhale 1 puff into the lungs once daily., Disp: , Rfl:     celecoxib (CELEBREX) 200 MG capsule, TAKE 1 CAPSULE BY MOUTH ONCE DAILY., Disp: 30 capsule, Rfl: 2    EPINEPHrine (EPIPEN) 0.3 mg/0.3 mL AtIn, as directed Injection prn anaphylaxis, Disp: , Rfl:     hydrOXYzine HCL (ATARAX) 25 MG tablet, Take 25 mg by mouth every 6 (six) hours as needed., Disp: , Rfl:     lansoprazole (PREVACID) 30 MG capsule, TAKE 1 CAPSULE BY MOUTH EVERY DAY, Disp: 90 capsule, Rfl: 2    levomefolate calcium (L-METHYLFOLATE) 15 mg Tab, L-Methylfolate, Disp: , Rfl:     lisinopriL 10 MG tablet, Take 10 mg by mouth once daily., Disp: , Rfl:     magnesium 200 mg Tab, 400 mg daily, Disp: , Rfl:     metFORMIN (GLUCOPHAGE-XR) 500 MG ER 24hr tablet, Take 1 tablet (500 mg total) by mouth 2 (two)  times daily with meals., Disp: 180 tablet, Rfl: 0    methylPREDNISolone (MEDROL DOSEPACK) 4 mg tablet, use as directed, Disp: 1 each, Rfl: 0    metoprolol succinate (TOPROL-XL) 100 MG 24 hr tablet, Take 1 tablet (100 mg total) by mouth once daily., Disp: 90 tablet, Rfl: 1    montelukast (SINGULAIR) 10 mg tablet, Take 10 mg by mouth once daily., Disp: , Rfl:     multivitamin-iron-folic acid (CENTRUM WOMEN) Tab, Centrum Women, Disp: , Rfl:     naratriptan (AMERGE) 1 MG Tab, Take 1 tab PO at onset of migraine, can repeat in 4 hrs if needed.  No more than twice per day or 3 days/wk., Disp: 18 tablet, Rfl: 5    omega 3-dha-epa-fish oil (FISH OIL) 1,000 mg (120 mg-180 mg) Cap, Take 1,000 mg by mouth 2 (two) times a day., Disp: , Rfl:     ondansetron (ZOFRAN) 8 MG tablet, Take 8 mg by mouth 2 (two) times daily as needed., Disp: , Rfl:     OXcarbazepine (TRILEPTAL) 300 MG Tab, Take 300 mg by mouth 2 (two) times daily., Disp: , Rfl:     pregabalin (LYRICA) 150 MG capsule, Take 150 mg by mouth 3 (three) times daily., Disp: , Rfl:     rosuvastatin (CRESTOR) 20 MG tablet, Take 20 mg by mouth., Disp: , Rfl:     spironolactone (ALDACTONE) 100 MG tablet, Take 1 tablet (100 mg total) by mouth 2 (two) times daily., Disp: 90 tablet, Rfl: 3    traZODone (DESYREL) 300 MG tablet, Take 300 mg by mouth once daily., Disp: , Rfl:     valACYclovir (VALTREX) 1000 MG tablet, Take 1,000 mg by mouth., Disp: , Rfl:     venlafaxine (EFFEXOR) 75 MG tablet, , Disp: , Rfl:     venlafaxine (EFFEXOR-XR) 150 MG Cp24, Take 150 mg by mouth once daily. 75mg with the 150 225mg, Disp: , Rfl:     vitamin D3-folic acid 125 mcg (5,000 unit)-1 mg Tab, Vitamin D, Disp: , Rfl:     zinc gluconate 50 mg tablet, Take 50 mg by mouth once daily., Disp: , Rfl:     Current Facility-Administered Medications:     levonorgestreL (LILETTA) 20.4 mcg/24 hrs (8 yrs) 52 mg IUD 18.6 mcg, 18.6 mcg, Intrauterine, , Charley Merritt MD, 18.6 mcg at 11/29/23 [id number]